# Patient Record
Sex: MALE | Race: WHITE | NOT HISPANIC OR LATINO | URBAN - METROPOLITAN AREA
[De-identification: names, ages, dates, MRNs, and addresses within clinical notes are randomized per-mention and may not be internally consistent; named-entity substitution may affect disease eponyms.]

---

## 2015-07-22 LAB — EJECTION FRACTION: 57

## 2018-06-19 ENCOUNTER — AMBULATORY - HEALTHEAST (OUTPATIENT)
Dept: CARDIOLOGY | Facility: CLINIC | Age: 78
End: 2018-06-19

## 2018-06-25 ENCOUNTER — OFFICE VISIT - HEALTHEAST (OUTPATIENT)
Dept: CARDIOLOGY | Facility: CLINIC | Age: 78
End: 2018-06-25

## 2018-06-25 DIAGNOSIS — I10 ESSENTIAL HYPERTENSION: ICD-10-CM

## 2018-06-25 DIAGNOSIS — E78.00 HYPERCHOLESTEREMIA: ICD-10-CM

## 2018-06-25 DIAGNOSIS — I25.83 CORONARY ATHEROSCLEROSIS DUE TO LIPID RICH PLAQUE: ICD-10-CM

## 2018-06-25 DIAGNOSIS — I62.9 INTRACRANIAL HEMORRHAGE (H): ICD-10-CM

## 2018-06-25 ASSESSMENT — MIFFLIN-ST. JEOR: SCORE: 1659.94

## 2018-07-06 ENCOUNTER — COMMUNICATION - HEALTHEAST (OUTPATIENT)
Dept: CARDIOLOGY | Facility: CLINIC | Age: 78
End: 2018-07-06

## 2018-07-09 ENCOUNTER — HOSPITAL ENCOUNTER (OUTPATIENT)
Dept: NUCLEAR MEDICINE | Facility: HOSPITAL | Age: 78
Discharge: HOME | End: 2018-07-09
Attending: INTERNAL MEDICINE

## 2018-07-09 ENCOUNTER — HOSPITAL ENCOUNTER (OUTPATIENT)
Dept: CARDIOLOGY | Facility: HOSPITAL | Age: 78
Discharge: HOME | End: 2018-07-09
Attending: INTERNAL MEDICINE

## 2018-07-09 DIAGNOSIS — I25.83 CORONARY ATHEROSCLEROSIS DUE TO LIPID RICH PLAQUE: ICD-10-CM

## 2018-07-09 DIAGNOSIS — E78.00 HYPERCHOLESTEREMIA: ICD-10-CM

## 2018-07-09 LAB
CV STRESS CURRENT BP HE: NORMAL
CV STRESS CURRENT HR HE: 68
CV STRESS CURRENT HR HE: 72
CV STRESS CURRENT HR HE: 73
CV STRESS CURRENT HR HE: 78
CV STRESS CURRENT HR HE: 79
CV STRESS CURRENT HR HE: 82
CV STRESS CURRENT HR HE: 85
CV STRESS CURRENT HR HE: 87
CV STRESS CURRENT HR HE: 87
CV STRESS CURRENT HR HE: 88
CV STRESS DEVIATION TIME HE: NORMAL
CV STRESS ECHO PERCENT HR HE: NORMAL
CV STRESS EXERCISE STAGE HE: NORMAL
CV STRESS FINAL RESTING BP HE: NORMAL
CV STRESS FINAL RESTING HR HE: 79
CV STRESS MAX HR HE: 87
CV STRESS MAX TREADMILL GRADE HE: 0
CV STRESS MAX TREADMILL SPEED HE: 0
CV STRESS PEAK DIA BP HE: NORMAL
CV STRESS PEAK SYS BP HE: NORMAL
CV STRESS PHASE HE: NORMAL
CV STRESS PROTOCOL HE: NORMAL
CV STRESS RESTING PT POSITION HE: NORMAL
CV STRESS ST DEVIATION AMOUNT HE: NORMAL
CV STRESS ST DEVIATION ELEVATION HE: NORMAL
CV STRESS ST EVELATION AMOUNT HE: NORMAL
CV STRESS TEST TYPE HE: NORMAL
CV STRESS TOTAL STAGE TIME MIN 1 HE: NORMAL
NUC STRESS EJECTION FRACTION: 43 %
STRESS ECHO BASELINE BP: NORMAL
STRESS ECHO BASELINE HR: 68
STRESS ECHO CALCULATED PERCENT HR: 62 %
STRESS ECHO LAST STRESS BP: NORMAL
STRESS ECHO LAST STRESS HR: 88

## 2018-07-12 ENCOUNTER — AMBULATORY - HEALTHEAST (OUTPATIENT)
Dept: CARDIOLOGY | Facility: CLINIC | Age: 78
End: 2018-07-12

## 2018-07-12 DIAGNOSIS — R94.39 ABNORMAL STRESS TEST: ICD-10-CM

## 2018-07-13 ENCOUNTER — COMMUNICATION - HEALTHEAST (OUTPATIENT)
Dept: CARDIOLOGY | Facility: CLINIC | Age: 78
End: 2018-07-13

## 2018-07-17 ENCOUNTER — SURGERY - HEALTHEAST (OUTPATIENT)
Dept: CARDIOLOGY | Facility: CLINIC | Age: 78
End: 2018-07-17

## 2018-07-23 ENCOUNTER — SURGERY - HEALTHEAST (OUTPATIENT)
Dept: CARDIOLOGY | Facility: CLINIC | Age: 78
End: 2018-07-23

## 2018-07-23 ASSESSMENT — MIFFLIN-ST. JEOR: SCORE: 1697.36

## 2018-07-26 ENCOUNTER — OFFICE VISIT - HEALTHEAST (OUTPATIENT)
Dept: CARDIOLOGY | Facility: CLINIC | Age: 78
End: 2018-07-26

## 2018-07-26 DIAGNOSIS — I25.10 CAD (CORONARY ARTERY DISEASE): ICD-10-CM

## 2018-07-26 ASSESSMENT — MIFFLIN-ST. JEOR: SCORE: 1697.36

## 2018-07-27 ENCOUNTER — TRANSFERRED RECORDS (OUTPATIENT)
Dept: HEALTH INFORMATION MANAGEMENT | Facility: CLINIC | Age: 78
End: 2018-07-27

## 2018-07-28 ENCOUNTER — TRANSFERRED RECORDS (OUTPATIENT)
Dept: HEALTH INFORMATION MANAGEMENT | Facility: CLINIC | Age: 78
End: 2018-07-28

## 2018-07-30 ENCOUNTER — SURGERY - HEALTHEAST (OUTPATIENT)
Dept: CARDIOLOGY | Facility: CLINIC | Age: 78
End: 2018-07-30

## 2018-07-30 DIAGNOSIS — Z01.818 PRE-OP EVALUATION: ICD-10-CM

## 2018-08-01 ENCOUNTER — HOSPITAL ENCOUNTER (OUTPATIENT)
Dept: SURGERY | Facility: CLINIC | Age: 78
Discharge: HOME | End: 2018-08-01

## 2018-08-01 DIAGNOSIS — I25.10 CAD (CORONARY ARTERY DISEASE): ICD-10-CM

## 2018-08-07 ENCOUNTER — HOSPITAL ENCOUNTER (OUTPATIENT)
Dept: SURGERY | Facility: CLINIC | Age: 78
Discharge: HOME | End: 2018-08-07
Attending: THORACIC SURGERY (CARDIOTHORACIC VASCULAR SURGERY)

## 2018-08-07 ENCOUNTER — HOSPITAL ENCOUNTER (OUTPATIENT)
Dept: CT IMAGING | Facility: CLINIC | Age: 78
Discharge: HOME | End: 2018-08-07
Attending: THORACIC SURGERY (CARDIOTHORACIC VASCULAR SURGERY)

## 2018-08-07 ENCOUNTER — HOSPITAL ENCOUNTER (OUTPATIENT)
Dept: CARDIOLOGY | Facility: CLINIC | Age: 78
Discharge: HOME | End: 2018-08-07
Attending: THORACIC SURGERY (CARDIOTHORACIC VASCULAR SURGERY)

## 2018-08-07 ENCOUNTER — HOSPITAL ENCOUNTER (OUTPATIENT)
Dept: ULTRASOUND IMAGING | Facility: CLINIC | Age: 78
Discharge: HOME | End: 2018-08-07
Attending: THORACIC SURGERY (CARDIOTHORACIC VASCULAR SURGERY)

## 2018-08-07 ENCOUNTER — ANESTHESIA - HEALTHEAST (OUTPATIENT)
Dept: SURGERY | Facility: CLINIC | Age: 78
End: 2018-08-07

## 2018-08-07 DIAGNOSIS — I25.10 CORONARY ATHEROSCLEROSIS: ICD-10-CM

## 2018-08-07 DIAGNOSIS — Z01.818 PRE-OP EVALUATION: ICD-10-CM

## 2018-08-07 DIAGNOSIS — I25.10 CAD (CORONARY ARTERY DISEASE): ICD-10-CM

## 2018-08-07 LAB
ABO/RH(D): NORMAL
ALBUMIN UR-MCNC: NEGATIVE MG/DL
ANION GAP SERPL CALCULATED.3IONS-SCNC: 8 MMOL/L (ref 5–18)
ANTIBODY SCREEN: NEGATIVE
AORTIC ROOT: 4.1 CM
AORTIC VALVE MEAN VELOCITY: 113 CM/S
APPEARANCE UR: CLEAR
ATRIAL RATE - MUSE: 64 BPM
AV DIMENSIONLESS INDEX VTI: 0.5
AV MEAN GRADIENT: 6 MMHG
AV PEAK GRADIENT: 10 MMHG
AV VALVE AREA: 1.8 CM2
AV VELOCITY RATIO: 0.5
BILIRUB UR QL STRIP: NEGATIVE
BSA FOR ECHO PROCEDURE: 2.21 M2
BUN SERPL-MCNC: 10 MG/DL (ref 8–28)
CALCIUM SERPL-MCNC: 9.2 MG/DL (ref 8.5–10.5)
CHLORIDE BLD-SCNC: 107 MMOL/L (ref 98–107)
CO2 SERPL-SCNC: 23 MMOL/L (ref 22–31)
COLOR UR AUTO: YELLOW
CREAT SERPL-MCNC: 0.82 MG/DL (ref 0.7–1.3)
CV BLOOD PRESSURE: NORMAL MMHG
CV ECHO HEIGHT: 71 IN
CV ECHO WEIGHT: 215 LBS
DIASTOLIC BLOOD PRESSURE - MUSE: NORMAL MMHG
DOP CALC AO PEAK VEL: 158 CM/S
DOP CALC AO VTI: 31.3 CM
DOP CALC LVOT AREA: 3.46 CM2
DOP CALC LVOT DIAMETER: 2.1 CM
DOP CALC LVOT PEAK VEL: 83.1 CM/S
DOP CALC LVOT STROKE VOLUME: 55.7 CM3
DOP CALCLVOT PEAK VEL VTI: 16.1 CM
EJECTION FRACTION: 65 % (ref 55–75)
ERYTHROCYTE [DISTWIDTH] IN BLOOD BY AUTOMATED COUNT: 14.3 % (ref 11–14.5)
FRACTIONAL SHORTENING: 25.6 % (ref 28–44)
GFR SERPL CREATININE-BSD FRML MDRD: >60 ML/MIN/1.73M2
GLUCOSE BLD-MCNC: 142 MG/DL (ref 70–125)
GLUCOSE UR STRIP-MCNC: NEGATIVE MG/DL
HBA1C MFR BLD: 7 % (ref 4.2–6.1)
HCT VFR BLD AUTO: 40.8 % (ref 40–54)
HGB BLD-MCNC: 14 G/DL (ref 14–18)
HGB UR QL STRIP: NEGATIVE
INR PPP: 1.06 (ref 0.9–1.1)
INTERPRETATION ECG - MUSE: NORMAL
INTERVENTRICULAR SEPTUM IN END DIASTOLE: 1.2 CM (ref 0.6–1)
IVS/PW RATIO: 1.1
KETONES UR STRIP-MCNC: NEGATIVE MG/DL
LA AREA 1: 18.4 CM2
LA AREA 2: 18.4 CM2
LEFT ATRIUM LENGTH: 5.21 CM
LEFT ATRIUM SIZE: 3.8 CM
LEFT ATRIUM TO AORTIC ROOT RATIO: 0.93 NO UNITS
LEFT ATRIUM VOLUME INDEX: 25 ML/M2
LEFT ATRIUM VOLUME: 55.2 ML
LEFT VENTRICLE CARDIAC INDEX: 2.2 L/MIN/M2
LEFT VENTRICLE CARDIAC OUTPUT: 4.8 L/MIN
LEFT VENTRICLE DIASTOLIC VOLUME INDEX: 37.6 CM3/M2 (ref 34–74)
LEFT VENTRICLE DIASTOLIC VOLUME: 83 CM3 (ref 62–150)
LEFT VENTRICLE HEART RATE: 86 BPM
LEFT VENTRICLE MASS INDEX: 67.7 G/M2
LEFT VENTRICLE SYSTOLIC VOLUME INDEX: 13.1 CM3/M2 (ref 11–31)
LEFT VENTRICLE SYSTOLIC VOLUME: 29 CM3 (ref 21–61)
LEFT VENTRICULAR INTERNAL DIMENSION IN DIASTOLE: 3.9 CM (ref 4.2–5.8)
LEFT VENTRICULAR INTERNAL DIMENSION IN SYSTOLE: 2.9 CM (ref 2.5–4)
LEFT VENTRICULAR MASS: 149.5 G
LEFT VENTRICULAR OUTFLOW TRACT MEAN GRADIENT: 1 MMHG
LEFT VENTRICULAR OUTFLOW TRACT MEAN VELOCITY: 54 CM/S
LEFT VENTRICULAR OUTFLOW TRACT PEAK GRADIENT: 3 MMHG
LEFT VENTRICULAR POSTERIOR WALL IN END DIASTOLE: 1.1 CM (ref 0.6–1)
LEUKOCYTE ESTERASE UR QL STRIP: NEGATIVE
LV STROKE VOLUME INDEX: 25.2 ML/M2
MAGNESIUM SERPL-MCNC: 2.1 MG/DL (ref 1.8–2.6)
MCH RBC QN AUTO: 30.1 PG (ref 27–34)
MCHC RBC AUTO-ENTMCNC: 34.3 G/DL (ref 32–36)
MCV RBC AUTO: 88 FL (ref 80–100)
MITRAL VALVE E/A RATIO: 0.5
MV AVERAGE E/E' RATIO: 6.2 CM/S
MV DECELERATION TIME: 388 MS
MV E'TISSUE VEL-LAT: 10.8 CM/S
MV E'TISSUE VEL-MED: 7.12 CM/S
MV LATERAL E/E' RATIO: 5.1
MV MEDIAL E/E' RATIO: 7.8
MV PEAK A VELOCITY: 113 CM/S
MV PEAK E VELOCITY: 55.3 CM/S
NITRATE UR QL: NEGATIVE
NUC REST DIASTOLIC VOLUME INDEX: 3440 LBS
NUC REST SYSTOLIC VOLUME INDEX: 71 IN
P AXIS - MUSE: 39 DEGREES
PH UR STRIP: 5 [PH] (ref 4.5–8)
PLATELET # BLD AUTO: 230 THOU/UL (ref 140–440)
PMV BLD AUTO: 9.3 FL (ref 8.5–12.5)
POTASSIUM BLD-SCNC: 4.3 MMOL/L (ref 3.5–5)
PR INTERVAL - MUSE: 182 MS
PREALB SERPL-MCNC: 20.4 MG/DL (ref 19–38)
QRS DURATION - MUSE: 100 MS
QT - MUSE: 420 MS
QTC - MUSE: 433 MS
R AXIS - MUSE: 7 DEGREES
RBC # BLD AUTO: 4.65 MILL/UL (ref 4.4–6.2)
SODIUM SERPL-SCNC: 138 MMOL/L (ref 136–145)
SP GR UR STRIP: 1.01 (ref 1–1.03)
SYSTOLIC BLOOD PRESSURE - MUSE: NORMAL MMHG
T AXIS - MUSE: 12 DEGREES
TRICUSPID VALVE ANULAR PLANE SYSTOLIC EXCURSION: 2.3 CM
UROBILINOGEN UR STRIP-ACNC: NORMAL
VENTRICULAR RATE- MUSE: 64 BPM
WBC: 13.2 THOU/UL (ref 4–11)

## 2018-08-07 ASSESSMENT — MIFFLIN-ST. JEOR
SCORE: 1670.6
SCORE: 1697.36

## 2018-08-08 ENCOUNTER — SURGERY - HEALTHEAST (OUTPATIENT)
Dept: SURGERY | Facility: CLINIC | Age: 78
End: 2018-08-08

## 2018-08-08 ENCOUNTER — HOSPITAL ENCOUNTER (INPATIENT)
Facility: CLINIC | Age: 78
LOS: 2 days | DRG: 003 | End: 2018-08-10
Attending: INTERNAL MEDICINE | Admitting: THORACIC SURGERY (CARDIOTHORACIC VASCULAR SURGERY)
Payer: MEDICARE

## 2018-08-08 ENCOUNTER — APPOINTMENT (OUTPATIENT)
Dept: GENERAL RADIOLOGY | Facility: CLINIC | Age: 78
DRG: 003 | End: 2018-08-08
Attending: INTERNAL MEDICINE
Payer: MEDICARE

## 2018-08-08 ENCOUNTER — SURGERY - HEALTHEAST (OUTPATIENT)
Dept: CARDIOLOGY | Facility: CLINIC | Age: 78
End: 2018-08-08

## 2018-08-08 PROBLEM — R57.0 CARDIOGENIC SHOCK (H): Status: ACTIVE | Noted: 2018-08-08

## 2018-08-08 LAB
ALBUMIN SERPL-MCNC: 2.7 G/DL (ref 3.4–5)
ALP SERPL-CCNC: 52 U/L (ref 40–150)
ALT SERPL W P-5'-P-CCNC: 19 U/L (ref 0–70)
ANION GAP SERPL CALCULATED.3IONS-SCNC: 17 MMOL/L (ref 3–14)
AST SERPL W P-5'-P-CCNC: 34 U/L (ref 0–45)
BACTERIA SPEC CULT: NORMAL
BASE DEFICIT BLDA-SCNC: 11.1 MMOL/L
BASE DEFICIT BLDA-SCNC: 3 MMOL/L
BASE DEFICIT BLDA-SCNC: 8.2 MMOL/L
BASOPHILS # BLD AUTO: 0 10E9/L (ref 0–0.2)
BASOPHILS NFR BLD AUTO: 0.2 %
BILIRUB SERPL-MCNC: 1 MG/DL (ref 0.2–1.3)
BLD PROD TYP BPU: NORMAL
BLD UNIT ID BPU: 0
BLOOD PRODUCT CODE: NORMAL
BPU ID: NORMAL
BUN SERPL-MCNC: 12 MG/DL (ref 7–30)
CA-I BLD-MCNC: 4.3 MG/DL (ref 4.4–5.2)
CA-I BLD-MCNC: 4.6 MG/DL (ref 4.4–5.2)
CA-I BLD-MCNC: 5.8 MG/DL (ref 4.4–5.2)
CALCIUM SERPL-MCNC: 7.8 MG/DL (ref 8.5–10.1)
CHLORIDE SERPL-SCNC: 114 MMOL/L (ref 94–109)
CO2 SERPL-SCNC: 17 MMOL/L (ref 20–32)
CREAT SERPL-MCNC: 0.8 MG/DL (ref 0.66–1.25)
CRP SERPL-MCNC: 5.3 MG/L (ref 0–8)
D DIMER PPP FEU-MCNC: 1.7 UG/ML FEU (ref 0–0.5)
DIFFERENTIAL METHOD BLD: ABNORMAL
EOSINOPHIL # BLD AUTO: 0 10E9/L (ref 0–0.7)
EOSINOPHIL NFR BLD AUTO: 0.1 %
ERYTHROCYTE [DISTWIDTH] IN BLOOD BY AUTOMATED COUNT: 14.9 % (ref 10–15)
ERYTHROCYTE [SEDIMENTATION RATE] IN BLOOD BY WESTERGREN METHOD: 12 MM/H (ref 0–20)
GFR SERPL CREATININE-BSD FRML MDRD: >90 ML/MIN/1.7M2
GLUCOSE SERPL-MCNC: 460 MG/DL (ref 70–99)
HBA1C MFR BLD: 6.2 % (ref 0–5.6)
HCO3 BLD-SCNC: 16 MMOL/L (ref 21–28)
HCO3 BLD-SCNC: 17 MMOL/L (ref 21–28)
HCO3 BLD-SCNC: 21 MMOL/L (ref 21–28)
HCT VFR BLD AUTO: 25.8 % (ref 40–53)
HGB BLD-MCNC: 8.6 G/DL (ref 13.3–17.7)
IMM GRANULOCYTES # BLD: 0.1 10E9/L (ref 0–0.4)
IMM GRANULOCYTES NFR BLD: 0.7 %
INR PPP: 2.09 (ref 0.86–1.14)
LACTATE BLD-SCNC: 12.1 MMOL/L (ref 0.7–2)
LACTATE BLD-SCNC: 12.2 MMOL/L (ref 0.7–2)
LACTATE BLD-SCNC: 14.6 MMOL/L (ref 0.7–2)
LACTATE BLD-SCNC: 15 MMOL/L (ref 0.7–2)
LDH SERPL L TO P-CCNC: 181 U/L (ref 85–227)
LYMPHOCYTES # BLD AUTO: 1.6 10E9/L (ref 0.8–5.3)
LYMPHOCYTES NFR BLD AUTO: 11.9 %
MAGNESIUM SERPL-MCNC: 2.2 MG/DL (ref 1.6–2.3)
MCH RBC QN AUTO: 29.5 PG (ref 26.5–33)
MCHC RBC AUTO-ENTMCNC: 33.3 G/DL (ref 31.5–36.5)
MCV RBC AUTO: 88 FL (ref 78–100)
MONOCYTES # BLD AUTO: 0.8 10E9/L (ref 0–1.3)
MONOCYTES NFR BLD AUTO: 6.2 %
NEUTROPHILS # BLD AUTO: 10.7 10E9/L (ref 1.6–8.3)
NEUTROPHILS NFR BLD AUTO: 80.9 %
NRBC # BLD AUTO: 0 10*3/UL
NRBC BLD AUTO-RTO: 0 /100
NUM BPU REQUESTED: 2
O2/TOTAL GAS SETTING VFR VENT: 100 %
O2/TOTAL GAS SETTING VFR VENT: 100 %
O2/TOTAL GAS SETTING VFR VENT: ABNORMAL %
OXYHGB MFR BLD: 97 % (ref 92–100)
PCO2 BLD: 31 MM HG (ref 35–45)
PCO2 BLD: 31 MM HG (ref 35–45)
PCO2 BLD: 42 MM HG (ref 35–45)
PH BLD: 7.2 PH (ref 7.35–7.45)
PH BLD: 7.34 PH (ref 7.35–7.45)
PH BLD: 7.44 PH (ref 7.35–7.45)
PHOSPHATE SERPL-MCNC: 2.6 MG/DL (ref 2.5–4.5)
PLATELET # BLD AUTO: 113 10E9/L (ref 150–450)
PO2 BLD: 155 MM HG (ref 80–105)
PO2 BLD: 454 MM HG (ref 80–105)
PO2 BLD: 457 MM HG (ref 80–105)
POTASSIUM BLD-SCNC: 2.6 MMOL/L (ref 3.4–5.3)
POTASSIUM SERPL-SCNC: 3 MMOL/L (ref 3.4–5.3)
PROT SERPL-MCNC: 3.8 G/DL (ref 6.8–8.8)
RBC # BLD AUTO: 2.92 10E12/L (ref 4.4–5.9)
SODIUM SERPL-SCNC: 148 MMOL/L (ref 133–144)
TRANSFUSION STATUS PATIENT QL: NORMAL
TROPONIN I SERPL-MCNC: 11.7 UG/L (ref 0–0.04)
WBC # BLD AUTO: 13.2 10E9/L (ref 4–11)

## 2018-08-08 PROCEDURE — 27211333 ZZ H CANNULA, VENOUS CENTRAL

## 2018-08-08 PROCEDURE — 25000128 H RX IP 250 OP 636: Performed by: THORACIC SURGERY (CARDIOTHORACIC VASCULAR SURGERY)

## 2018-08-08 PROCEDURE — 82803 BLOOD GASES ANY COMBINATION: CPT | Performed by: THORACIC SURGERY (CARDIOTHORACIC VASCULAR SURGERY)

## 2018-08-08 PROCEDURE — 86900 BLOOD TYPING SEROLOGIC ABO: CPT | Performed by: THORACIC SURGERY (CARDIOTHORACIC VASCULAR SURGERY)

## 2018-08-08 PROCEDURE — 40000556 ZZH STATISTIC PERIPHERAL IV START W US GUIDANCE

## 2018-08-08 PROCEDURE — 86901 BLOOD TYPING SEROLOGIC RH(D): CPT | Performed by: THORACIC SURGERY (CARDIOTHORACIC VASCULAR SURGERY)

## 2018-08-08 PROCEDURE — 25000128 H RX IP 250 OP 636: Performed by: STUDENT IN AN ORGANIZED HEALTH CARE EDUCATION/TRAINING PROGRAM

## 2018-08-08 PROCEDURE — 85025 COMPLETE CBC W/AUTO DIFF WBC: CPT | Performed by: STUDENT IN AN ORGANIZED HEALTH CARE EDUCATION/TRAINING PROGRAM

## 2018-08-08 PROCEDURE — 5A1945Z RESPIRATORY VENTILATION, 24-96 CONSECUTIVE HOURS: ICD-10-PCS | Performed by: THORACIC SURGERY (CARDIOTHORACIC VASCULAR SURGERY)

## 2018-08-08 PROCEDURE — 25000128 H RX IP 250 OP 636: Performed by: INTERNAL MEDICINE

## 2018-08-08 PROCEDURE — 83605 ASSAY OF LACTIC ACID: CPT | Performed by: THORACIC SURGERY (CARDIOTHORACIC VASCULAR SURGERY)

## 2018-08-08 PROCEDURE — 80307 DRUG TEST PRSMV CHEM ANLYZR: CPT | Performed by: STUDENT IN AN ORGANIZED HEALTH CARE EDUCATION/TRAINING PROGRAM

## 2018-08-08 PROCEDURE — 33947 ECMO/ECLS INITIATION ARTERY: CPT

## 2018-08-08 PROCEDURE — 84100 ASSAY OF PHOSPHORUS: CPT | Performed by: STUDENT IN AN ORGANIZED HEALTH CARE EDUCATION/TRAINING PROGRAM

## 2018-08-08 PROCEDURE — 27211327 ZZ H KIT ECMO CIRCUIT ONLY, ADULT

## 2018-08-08 PROCEDURE — 86850 RBC ANTIBODY SCREEN: CPT | Performed by: THORACIC SURGERY (CARDIOTHORACIC VASCULAR SURGERY)

## 2018-08-08 PROCEDURE — 83051 HEMOGLOBIN PLASMA: CPT | Performed by: STUDENT IN AN ORGANIZED HEALTH CARE EDUCATION/TRAINING PROGRAM

## 2018-08-08 PROCEDURE — 83605 ASSAY OF LACTIC ACID: CPT | Performed by: STUDENT IN AN ORGANIZED HEALTH CARE EDUCATION/TRAINING PROGRAM

## 2018-08-08 PROCEDURE — 82330 ASSAY OF CALCIUM: CPT | Performed by: THORACIC SURGERY (CARDIOTHORACIC VASCULAR SURGERY)

## 2018-08-08 PROCEDURE — 27210545 ZZH PUMP CENTRIMAG ADULT

## 2018-08-08 PROCEDURE — 82533 TOTAL CORTISOL: CPT | Performed by: STUDENT IN AN ORGANIZED HEALTH CARE EDUCATION/TRAINING PROGRAM

## 2018-08-08 PROCEDURE — 84132 ASSAY OF SERUM POTASSIUM: CPT | Performed by: THORACIC SURGERY (CARDIOTHORACIC VASCULAR SURGERY)

## 2018-08-08 PROCEDURE — 86850 RBC ANTIBODY SCREEN: CPT | Performed by: STUDENT IN AN ORGANIZED HEALTH CARE EDUCATION/TRAINING PROGRAM

## 2018-08-08 PROCEDURE — 27211336 ZZ H CANNULA, ARTERIAL CENTRAL

## 2018-08-08 PROCEDURE — P9037 PLATE PHERES LEUKOREDU IRRAD: HCPCS | Performed by: STUDENT IN AN ORGANIZED HEALTH CARE EDUCATION/TRAINING PROGRAM

## 2018-08-08 PROCEDURE — 85652 RBC SED RATE AUTOMATED: CPT | Performed by: STUDENT IN AN ORGANIZED HEALTH CARE EDUCATION/TRAINING PROGRAM

## 2018-08-08 PROCEDURE — 40000048 ZZH STATISTIC DAILY SWAN MONITORING

## 2018-08-08 PROCEDURE — 40000986 XR CHEST PORT 1 VW

## 2018-08-08 PROCEDURE — 82330 ASSAY OF CALCIUM: CPT | Performed by: STUDENT IN AN ORGANIZED HEALTH CARE EDUCATION/TRAINING PROGRAM

## 2018-08-08 PROCEDURE — 40000014 ZZH STATISTIC ARTERIAL MONITORING DAILY

## 2018-08-08 PROCEDURE — 80053 COMPREHEN METABOLIC PANEL: CPT | Performed by: STUDENT IN AN ORGANIZED HEALTH CARE EDUCATION/TRAINING PROGRAM

## 2018-08-08 PROCEDURE — 40000196 ZZH STATISTIC RAPCV CVP MONITORING

## 2018-08-08 PROCEDURE — 27211329 ZZ H DEVICE OXYGENATOR QUADROX ECMO, ADULT

## 2018-08-08 PROCEDURE — 80347 BENZODIAZEPINES 13 OR MORE: CPT | Performed by: STUDENT IN AN ORGANIZED HEALTH CARE EDUCATION/TRAINING PROGRAM

## 2018-08-08 PROCEDURE — 20000004 ZZH R&B ICU UMMC

## 2018-08-08 PROCEDURE — 85379 FIBRIN DEGRADATION QUANT: CPT | Performed by: STUDENT IN AN ORGANIZED HEALTH CARE EDUCATION/TRAINING PROGRAM

## 2018-08-08 PROCEDURE — 86140 C-REACTIVE PROTEIN: CPT | Performed by: STUDENT IN AN ORGANIZED HEALTH CARE EDUCATION/TRAINING PROGRAM

## 2018-08-08 PROCEDURE — 25000125 ZZHC RX 250

## 2018-08-08 PROCEDURE — 25000125 ZZHC RX 250: Performed by: THORACIC SURGERY (CARDIOTHORACIC VASCULAR SURGERY)

## 2018-08-08 PROCEDURE — 86901 BLOOD TYPING SEROLOGIC RH(D): CPT | Performed by: STUDENT IN AN ORGANIZED HEALTH CARE EDUCATION/TRAINING PROGRAM

## 2018-08-08 PROCEDURE — 94002 VENT MGMT INPAT INIT DAY: CPT

## 2018-08-08 PROCEDURE — 40000275 ZZH STATISTIC RCP TIME EA 10 MIN

## 2018-08-08 PROCEDURE — P9059 PLASMA, FRZ BETWEEN 8-24HOUR: HCPCS | Performed by: STUDENT IN AN ORGANIZED HEALTH CARE EDUCATION/TRAINING PROGRAM

## 2018-08-08 PROCEDURE — P9016 RBC LEUKOCYTES REDUCED: HCPCS | Performed by: THORACIC SURGERY (CARDIOTHORACIC VASCULAR SURGERY)

## 2018-08-08 PROCEDURE — 83735 ASSAY OF MAGNESIUM: CPT | Performed by: STUDENT IN AN ORGANIZED HEALTH CARE EDUCATION/TRAINING PROGRAM

## 2018-08-08 PROCEDURE — 3E033XZ INTRODUCTION OF VASOPRESSOR INTO PERIPHERAL VEIN, PERCUTANEOUS APPROACH: ICD-10-PCS | Performed by: THORACIC SURGERY (CARDIOTHORACIC VASCULAR SURGERY)

## 2018-08-08 PROCEDURE — 84484 ASSAY OF TROPONIN QUANT: CPT | Performed by: STUDENT IN AN ORGANIZED HEALTH CARE EDUCATION/TRAINING PROGRAM

## 2018-08-08 PROCEDURE — 83036 HEMOGLOBIN GLYCOSYLATED A1C: CPT | Performed by: THORACIC SURGERY (CARDIOTHORACIC VASCULAR SURGERY)

## 2018-08-08 PROCEDURE — 82805 BLOOD GASES W/O2 SATURATION: CPT | Performed by: STUDENT IN AN ORGANIZED HEALTH CARE EDUCATION/TRAINING PROGRAM

## 2018-08-08 PROCEDURE — 25000128 H RX IP 250 OP 636

## 2018-08-08 PROCEDURE — 5A15223 EXTRACORPOREAL MEMBRANE OXYGENATION, CONTINUOUS: ICD-10-PCS | Performed by: THORACIC SURGERY (CARDIOTHORACIC VASCULAR SURGERY)

## 2018-08-08 PROCEDURE — 86900 BLOOD TYPING SEROLOGIC ABO: CPT | Performed by: STUDENT IN AN ORGANIZED HEALTH CARE EDUCATION/TRAINING PROGRAM

## 2018-08-08 PROCEDURE — 00000146 ZZHCL STATISTIC GLUCOSE BY METER IP

## 2018-08-08 PROCEDURE — 85610 PROTHROMBIN TIME: CPT | Performed by: STUDENT IN AN ORGANIZED HEALTH CARE EDUCATION/TRAINING PROGRAM

## 2018-08-08 PROCEDURE — 83615 LACTATE (LD) (LDH) ENZYME: CPT | Performed by: STUDENT IN AN ORGANIZED HEALTH CARE EDUCATION/TRAINING PROGRAM

## 2018-08-08 PROCEDURE — 86923 COMPATIBILITY TEST ELECTRIC: CPT | Performed by: THORACIC SURGERY (CARDIOTHORACIC VASCULAR SURGERY)

## 2018-08-08 PROCEDURE — 40000344 ZZHCL STATISTIC THAWING COMPONENT: Performed by: STUDENT IN AN ORGANIZED HEALTH CARE EDUCATION/TRAINING PROGRAM

## 2018-08-08 RX ORDER — MIDAZOLAM (PF) 1 MG/ML IN 0.9 % SODIUM CHLORIDE INTRAVENOUS SOLUTION
1-8 CONTINUOUS
Status: DISCONTINUED | OUTPATIENT
Start: 2018-08-08 | End: 2018-08-10 | Stop reason: HOSPADM

## 2018-08-08 RX ORDER — PIPERACILLIN SODIUM, TAZOBACTAM SODIUM 3; .375 G/15ML; G/15ML
3.38 INJECTION, POWDER, LYOPHILIZED, FOR SOLUTION INTRAVENOUS EVERY 6 HOURS
Status: DISCONTINUED | OUTPATIENT
Start: 2018-08-09 | End: 2018-08-10 | Stop reason: HOSPADM

## 2018-08-08 RX ORDER — LIDOCAINE 40 MG/G
CREAM TOPICAL
Status: DISCONTINUED | OUTPATIENT
Start: 2018-08-08 | End: 2018-08-10 | Stop reason: HOSPADM

## 2018-08-08 RX ORDER — MAGNESIUM SULFATE HEPTAHYDRATE 40 MG/ML
2 INJECTION, SOLUTION INTRAVENOUS DAILY PRN
Status: DISCONTINUED | OUTPATIENT
Start: 2018-08-08 | End: 2018-08-09 | Stop reason: DRUGHIGH

## 2018-08-08 RX ORDER — HEPARIN SODIUM (PORCINE) LOCK FLUSH IV SOLN 100 UNIT/ML 100 UNIT/ML
5-10 SOLUTION INTRAVENOUS EVERY 30 MIN PRN
Status: DISCONTINUED | OUTPATIENT
Start: 2018-08-08 | End: 2018-08-08

## 2018-08-08 RX ORDER — NALOXONE HYDROCHLORIDE 0.4 MG/ML
.1-.4 INJECTION, SOLUTION INTRAMUSCULAR; INTRAVENOUS; SUBCUTANEOUS
Status: DISCONTINUED | OUTPATIENT
Start: 2018-08-08 | End: 2018-08-10 | Stop reason: HOSPADM

## 2018-08-08 RX ORDER — FENTANYL CITRATE 50 UG/ML
50 INJECTION, SOLUTION INTRAMUSCULAR; INTRAVENOUS EVERY 30 MIN PRN
Status: DISCONTINUED | OUTPATIENT
Start: 2018-08-08 | End: 2018-08-10 | Stop reason: HOSPADM

## 2018-08-08 RX ORDER — MAGNESIUM SULFATE HEPTAHYDRATE 40 MG/ML
4 INJECTION, SOLUTION INTRAVENOUS EVERY 4 HOURS PRN
Status: DISCONTINUED | OUTPATIENT
Start: 2018-08-08 | End: 2018-08-09 | Stop reason: DRUGHIGH

## 2018-08-08 RX ORDER — POTASSIUM CHLORIDE 29.8 MG/ML
20 INJECTION INTRAVENOUS
Status: COMPLETED | OUTPATIENT
Start: 2018-08-08 | End: 2018-08-09

## 2018-08-08 RX ORDER — MEPERIDINE HYDROCHLORIDE 50 MG/ML
25-50 INJECTION INTRAMUSCULAR; INTRAVENOUS; SUBCUTANEOUS
Status: ACTIVE | OUTPATIENT
Start: 2018-08-08 | End: 2018-08-09

## 2018-08-08 RX ORDER — NICOTINE POLACRILEX 4 MG
15-30 LOZENGE BUCCAL
Status: DISCONTINUED | OUTPATIENT
Start: 2018-08-08 | End: 2018-08-10 | Stop reason: HOSPADM

## 2018-08-08 RX ORDER — NALOXONE HYDROCHLORIDE 0.4 MG/ML
.1-.4 INJECTION, SOLUTION INTRAMUSCULAR; INTRAVENOUS; SUBCUTANEOUS
Status: CANCELLED | OUTPATIENT
Start: 2018-08-08

## 2018-08-08 RX ORDER — LIDOCAINE 40 MG/G
CREAM TOPICAL
Status: DISCONTINUED | OUTPATIENT
Start: 2018-08-08 | End: 2018-08-08

## 2018-08-08 RX ORDER — ACETYLCYSTEINE 200 MG/ML
1 SOLUTION ORAL; RESPIRATORY (INHALATION)
Status: DISCONTINUED | OUTPATIENT
Start: 2018-08-08 | End: 2018-08-09 | Stop reason: CLARIF

## 2018-08-08 RX ORDER — DOPAMINE HYDROCHLORIDE 160 MG/100ML
2-20 INJECTION, SOLUTION INTRAVENOUS CONTINUOUS
Status: DISCONTINUED | OUTPATIENT
Start: 2018-08-08 | End: 2018-08-10 | Stop reason: HOSPADM

## 2018-08-08 RX ORDER — LIDOCAINE HYDROCHLORIDE ANHYDROUS AND DEXTROSE MONOHYDRATE .8; 5 G/100ML; G/100ML
1-4 INJECTION, SOLUTION INTRAVENOUS CONTINUOUS
Status: DISCONTINUED | OUTPATIENT
Start: 2018-08-08 | End: 2018-08-10 | Stop reason: HOSPADM

## 2018-08-08 RX ORDER — HEPARIN SODIUM (PORCINE) LOCK FLUSH IV SOLN 100 UNIT/ML 100 UNIT/ML
5-10 SOLUTION INTRAVENOUS EVERY 30 MIN PRN
Status: DISCONTINUED | OUTPATIENT
Start: 2018-08-08 | End: 2018-08-10 | Stop reason: HOSPADM

## 2018-08-08 RX ORDER — DEXTROSE MONOHYDRATE 25 G/50ML
25-50 INJECTION, SOLUTION INTRAVENOUS
Status: DISCONTINUED | OUTPATIENT
Start: 2018-08-08 | End: 2018-08-10 | Stop reason: HOSPADM

## 2018-08-08 RX ORDER — CALCIUM CHLORIDE 100 MG/ML
INJECTION INTRAVENOUS; INTRAVENTRICULAR
Status: COMPLETED
Start: 2018-08-08 | End: 2018-08-08

## 2018-08-08 RX ORDER — MIDAZOLAM (PF) 1 MG/ML IN 0.9 % SODIUM CHLORIDE INTRAVENOUS SOLUTION
1-8 CONTINUOUS
Status: CANCELLED | OUTPATIENT
Start: 2018-08-08

## 2018-08-08 RX ADMIN — Medication 4 MG/HR: at 23:53

## 2018-08-08 RX ADMIN — SODIUM BICARBONATE 200 MEQ: 84 INJECTION, SOLUTION INTRAVENOUS at 23:52

## 2018-08-08 RX ADMIN — DEXTROSE MONOHYDRATE 2 MCG/KG/MIN: 50 INJECTION, SOLUTION INTRAVENOUS at 23:54

## 2018-08-08 RX ADMIN — VASOPRESSIN 4 UNITS/HR: 20 INJECTION INTRAVENOUS at 23:52

## 2018-08-08 RX ADMIN — FENTANYL CITRATE 75 MCG/HR: 50 INJECTION, SOLUTION INTRAMUSCULAR; INTRAVENOUS at 23:10

## 2018-08-08 RX ADMIN — LIDOCAINE HYDROCHLORIDE 1 MG/MIN: 8 INJECTION, SOLUTION INTRAVENOUS at 23:53

## 2018-08-08 RX ADMIN — CALCIUM CHLORIDE 1 G: 100 INJECTION INTRAVENOUS; INTRAVENTRICULAR at 23:11

## 2018-08-08 RX ADMIN — HUMAN INSULIN 6 UNITS/HR: 100 INJECTION, SOLUTION SUBCUTANEOUS at 23:53

## 2018-08-08 RX ADMIN — NOREPINEPHRINE BITARTRATE 0.3 MCG/KG/MIN: 1 INJECTION INTRAVENOUS at 23:54

## 2018-08-08 RX ADMIN — Medication 200 MEQ: at 23:52

## 2018-08-08 RX ADMIN — EPINEPHRINE 0.3 MCG/KG/MIN: 1 INJECTION PARENTERAL at 23:53

## 2018-08-08 RX ADMIN — CALCIUM CHLORIDE 1 G: 100 INJECTION INTRAVENOUS; INTRAVENTRICULAR at 23:33

## 2018-08-08 ASSESSMENT — MIFFLIN-ST. JEOR
SCORE: 1639.76
SCORE: 1639.76

## 2018-08-09 ENCOUNTER — APPOINTMENT (OUTPATIENT)
Dept: GENERAL RADIOLOGY | Facility: CLINIC | Age: 78
DRG: 003 | End: 2018-08-09
Attending: INTERNAL MEDICINE
Payer: MEDICARE

## 2018-08-09 ENCOUNTER — ALLIED HEALTH/NURSE VISIT (OUTPATIENT)
Dept: NEUROLOGY | Facility: CLINIC | Age: 78
DRG: 003 | End: 2018-08-09
Attending: PSYCHIATRY & NEUROLOGY
Payer: MEDICARE

## 2018-08-09 ENCOUNTER — APPOINTMENT (OUTPATIENT)
Dept: ULTRASOUND IMAGING | Facility: CLINIC | Age: 78
DRG: 003 | End: 2018-08-09
Attending: INTERNAL MEDICINE
Payer: MEDICARE

## 2018-08-09 DIAGNOSIS — R57.0 CARDIOGENIC SHOCK (H): Primary | ICD-10-CM

## 2018-08-09 LAB
ABO + RH BLD: NORMAL
ABO + RH BLD: NORMAL
ALBUMIN SERPL-MCNC: 2.1 G/DL (ref 3.4–5)
ALBUMIN SERPL-MCNC: 2.6 G/DL (ref 3.4–5)
ALBUMIN SERPL-MCNC: 2.7 G/DL (ref 3.4–5)
ALBUMIN SERPL-MCNC: 2.7 G/DL (ref 3.4–5)
ALBUMIN SERPL-MCNC: 2.9 G/DL (ref 3.4–5)
ALBUMIN UR-MCNC: NEGATIVE MG/DL
ALP SERPL-CCNC: 27 U/L (ref 40–150)
ALP SERPL-CCNC: 29 U/L (ref 40–150)
ALP SERPL-CCNC: 34 U/L (ref 40–150)
ALT SERPL W P-5'-P-CCNC: 109 U/L (ref 0–70)
ALT SERPL W P-5'-P-CCNC: 14 U/L (ref 0–70)
ALT SERPL W P-5'-P-CCNC: 16 U/L (ref 0–70)
ALT SERPL W P-5'-P-CCNC: 17 U/L (ref 0–70)
ALT SERPL W P-5'-P-CCNC: 18 U/L (ref 0–70)
AMPHETAMINES UR QL SCN: NEGATIVE
ANION GAP SERPL CALCULATED.3IONS-SCNC: 19 MMOL/L (ref 3–14)
ANION GAP SERPL CALCULATED.3IONS-SCNC: 7 MMOL/L (ref 3–14)
ANION GAP SERPL CALCULATED.3IONS-SCNC: 7 MMOL/L (ref 3–14)
ANION GAP SERPL CALCULATED.3IONS-SCNC: 8 MMOL/L (ref 3–14)
ANION GAP SERPL CALCULATED.3IONS-SCNC: 9 MMOL/L (ref 3–14)
ANION GAP SERPL CALCULATED.3IONS-SCNC: 9 MMOL/L (ref 3–14)
APPEARANCE UR: CLEAR
APTT PPP: 101 SEC (ref 22–37)
APTT PPP: 47 SEC (ref 22–37)
APTT PPP: 54 SEC (ref 22–37)
APTT PPP: 54 SEC (ref 22–37)
APTT PPP: 88 SEC (ref 22–37)
AST SERPL W P-5'-P-CCNC: 118 U/L (ref 0–45)
AST SERPL W P-5'-P-CCNC: 21 U/L (ref 0–45)
AST SERPL W P-5'-P-CCNC: 25 U/L (ref 0–45)
AST SERPL W P-5'-P-CCNC: 29 U/L (ref 0–45)
AST SERPL W P-5'-P-CCNC: 38 U/L (ref 0–45)
AT III ACT/NOR PPP CHRO: 34 % (ref 85–135)
BACTERIA SPEC CULT: NORMAL
BACTERIA SPEC CULT: NORMAL
BARBITURATES UR QL: NEGATIVE
BASE DEFICIT BLDA-SCNC: 0.6 MMOL/L
BASE DEFICIT BLDA-SCNC: 1.4 MMOL/L
BASE DEFICIT BLDA-SCNC: 2.2 MMOL/L
BASE DEFICIT BLDA-SCNC: 2.6 MMOL/L
BASE DEFICIT BLDA-SCNC: 3.5 MMOL/L
BASE DEFICIT BLDA-SCNC: NORMAL MMOL/L
BASE DEFICIT BLDV-SCNC: 0.5 MMOL/L
BASE DEFICIT BLDV-SCNC: NORMAL MMOL/L
BASE EXCESS BLDA CALC-SCNC: 1.1 MMOL/L
BASE EXCESS BLDA CALC-SCNC: 1.2 MMOL/L
BASE EXCESS BLDA CALC-SCNC: 1.5 MMOL/L
BASE EXCESS BLDA CALC-SCNC: 2.1 MMOL/L
BASE EXCESS BLDA CALC-SCNC: 3.2 MMOL/L
BASE EXCESS BLDA CALC-SCNC: 5.4 MMOL/L
BASE EXCESS BLDA CALC-SCNC: 6.1 MMOL/L
BASE EXCESS BLDA CALC-SCNC: 6.3 MMOL/L
BASE EXCESS BLDA CALC-SCNC: 6.3 MMOL/L
BASE EXCESS BLDA CALC-SCNC: 6.9 MMOL/L
BASE EXCESS BLDA CALC-SCNC: 7.2 MMOL/L
BASE EXCESS BLDA CALC-SCNC: 7.2 MMOL/L
BASE EXCESS BLDA CALC-SCNC: NORMAL MMOL/L
BASE EXCESS BLDV CALC-SCNC: 2.4 MMOL/L
BASE EXCESS BLDV CALC-SCNC: 5.1 MMOL/L
BASE EXCESS BLDV CALC-SCNC: 5.4 MMOL/L
BASE EXCESS BLDV CALC-SCNC: 6.3 MMOL/L
BASE EXCESS BLDV CALC-SCNC: NORMAL MMOL/L
BENZODIAZ UR QL: POSITIVE
BILIRUB SERPL-MCNC: 1.5 MG/DL (ref 0.2–1.3)
BILIRUB SERPL-MCNC: 1.8 MG/DL (ref 0.2–1.3)
BILIRUB SERPL-MCNC: 1.8 MG/DL (ref 0.2–1.3)
BILIRUB SERPL-MCNC: 2.1 MG/DL (ref 0.2–1.3)
BILIRUB SERPL-MCNC: 2.2 MG/DL (ref 0.2–1.3)
BILIRUB UR QL STRIP: NEGATIVE
BLD GP AB SCN SERPL QL: NORMAL
BLD PROD TYP BPU: NORMAL
BLD UNIT ID BPU: 0
BLOOD BANK CMNT PATIENT-IMP: NORMAL
BLOOD PRODUCT CODE: NORMAL
BPU ID: NORMAL
BUN SERPL-MCNC: 14 MG/DL (ref 7–30)
BUN SERPL-MCNC: 14 MG/DL (ref 7–30)
BUN SERPL-MCNC: 16 MG/DL (ref 7–30)
BUN SERPL-MCNC: 17 MG/DL (ref 7–30)
BUN SERPL-MCNC: 20 MG/DL (ref 7–30)
BUN SERPL-MCNC: 22 MG/DL (ref 7–30)
CA-I BLD-MCNC: 4.3 MG/DL (ref 4.4–5.2)
CA-I BLD-MCNC: 4.3 MG/DL (ref 4.4–5.2)
CA-I BLD-MCNC: 4.4 MG/DL (ref 4.4–5.2)
CA-I BLD-MCNC: 4.4 MG/DL (ref 4.4–5.2)
CA-I BLD-MCNC: 4.6 MG/DL (ref 4.4–5.2)
CA-I BLD-MCNC: 4.6 MG/DL (ref 4.4–5.2)
CA-I BLD-MCNC: 4.9 MG/DL (ref 4.4–5.2)
CALCIUM SERPL-MCNC: 7.6 MG/DL (ref 8.5–10.1)
CALCIUM SERPL-MCNC: 7.8 MG/DL (ref 8.5–10.1)
CALCIUM SERPL-MCNC: 7.9 MG/DL (ref 8.5–10.1)
CALCIUM SERPL-MCNC: 8 MG/DL (ref 8.5–10.1)
CALCIUM SERPL-MCNC: 8.5 MG/DL (ref 8.5–10.1)
CALCIUM SERPL-MCNC: 8.8 MG/DL (ref 8.5–10.1)
CANNABINOIDS UR QL SCN: NEGATIVE
CHLORIDE SERPL-SCNC: 116 MMOL/L (ref 94–109)
CHLORIDE SERPL-SCNC: 118 MMOL/L (ref 94–109)
CHLORIDE SERPL-SCNC: 119 MMOL/L (ref 94–109)
CHLORIDE SERPL-SCNC: 119 MMOL/L (ref 94–109)
CHLORIDE SERPL-SCNC: 120 MMOL/L (ref 94–109)
CHLORIDE SERPL-SCNC: 120 MMOL/L (ref 94–109)
CO2 SERPL-SCNC: 22 MMOL/L (ref 20–32)
CO2 SERPL-SCNC: 27 MMOL/L (ref 20–32)
CO2 SERPL-SCNC: 27 MMOL/L (ref 20–32)
CO2 SERPL-SCNC: 28 MMOL/L (ref 20–32)
CO2 SERPL-SCNC: 30 MMOL/L (ref 20–32)
CO2 SERPL-SCNC: 31 MMOL/L (ref 20–32)
COCAINE UR QL: NEGATIVE
COLOR UR AUTO: ABNORMAL
CREAT SERPL-MCNC: 0.9 MG/DL (ref 0.66–1.25)
CREAT SERPL-MCNC: 0.92 MG/DL (ref 0.66–1.25)
CREAT SERPL-MCNC: 1.23 MG/DL (ref 0.66–1.25)
CREAT SERPL-MCNC: 1.41 MG/DL (ref 0.66–1.25)
CREAT SERPL-MCNC: 1.77 MG/DL (ref 0.66–1.25)
CREAT SERPL-MCNC: 2.15 MG/DL (ref 0.66–1.25)
CRP SERPL-MCNC: 14 MG/L (ref 0–8)
D DIMER PPP FEU-MCNC: 0.6 UG/ML FEU (ref 0–0.5)
D DIMER PPP FEU-MCNC: 13 UG/ML FEU (ref 0–0.5)
ERYTHROCYTE [DISTWIDTH] IN BLOOD BY AUTOMATED COUNT: 14.8 % (ref 10–15)
ERYTHROCYTE [DISTWIDTH] IN BLOOD BY AUTOMATED COUNT: 14.8 % (ref 10–15)
ERYTHROCYTE [DISTWIDTH] IN BLOOD BY AUTOMATED COUNT: 15 % (ref 10–15)
ERYTHROCYTE [DISTWIDTH] IN BLOOD BY AUTOMATED COUNT: 15.1 % (ref 10–15)
ERYTHROCYTE [DISTWIDTH] IN BLOOD BY AUTOMATED COUNT: 15.2 % (ref 10–15)
ERYTHROCYTE [DISTWIDTH] IN BLOOD BY AUTOMATED COUNT: 15.5 % (ref 10–15)
ERYTHROCYTE [SEDIMENTATION RATE] IN BLOOD BY WESTERGREN METHOD: 63 MM/H (ref 0–20)
ETHANOL UR QL SCN: NEGATIVE
FIBRINOGEN PPP-MCNC: 131 MG/DL (ref 200–420)
FIBRINOGEN PPP-MCNC: 163 MG/DL (ref 200–420)
FIBRINOGEN PPP-MCNC: 165 MG/DL (ref 200–420)
FIBRINOGEN PPP-MCNC: 171 MG/DL (ref 200–420)
FIBRINOGEN PPP-MCNC: 182 MG/DL (ref 200–420)
GFR SERPL CREATININE-BSD FRML MDRD: 30 ML/MIN/1.7M2
GFR SERPL CREATININE-BSD FRML MDRD: 37 ML/MIN/1.7M2
GFR SERPL CREATININE-BSD FRML MDRD: 48 ML/MIN/1.7M2
GFR SERPL CREATININE-BSD FRML MDRD: 57 ML/MIN/1.7M2
GFR SERPL CREATININE-BSD FRML MDRD: 79 ML/MIN/1.7M2
GFR SERPL CREATININE-BSD FRML MDRD: 81 ML/MIN/1.7M2
GLUCOSE BLD-MCNC: 112 MG/DL (ref 70–99)
GLUCOSE BLD-MCNC: 149 MG/DL (ref 70–99)
GLUCOSE BLD-MCNC: 213 MG/DL (ref 70–99)
GLUCOSE BLD-MCNC: 344 MG/DL (ref 70–99)
GLUCOSE BLDC GLUCOMTR-MCNC: 104 MG/DL (ref 70–99)
GLUCOSE BLDC GLUCOMTR-MCNC: 128 MG/DL (ref 70–99)
GLUCOSE BLDC GLUCOMTR-MCNC: 148 MG/DL (ref 70–99)
GLUCOSE BLDC GLUCOMTR-MCNC: 156 MG/DL (ref 70–99)
GLUCOSE BLDC GLUCOMTR-MCNC: 192 MG/DL (ref 70–99)
GLUCOSE BLDC GLUCOMTR-MCNC: 239 MG/DL (ref 70–99)
GLUCOSE BLDC GLUCOMTR-MCNC: 257 MG/DL (ref 70–99)
GLUCOSE BLDC GLUCOMTR-MCNC: 311 MG/DL (ref 70–99)
GLUCOSE BLDC GLUCOMTR-MCNC: 315 MG/DL (ref 70–99)
GLUCOSE BLDC GLUCOMTR-MCNC: 331 MG/DL (ref 70–99)
GLUCOSE BLDC GLUCOMTR-MCNC: 340 MG/DL (ref 70–99)
GLUCOSE BLDC GLUCOMTR-MCNC: 359 MG/DL (ref 70–99)
GLUCOSE BLDC GLUCOMTR-MCNC: 370 MG/DL (ref 70–99)
GLUCOSE BLDC GLUCOMTR-MCNC: 395 MG/DL (ref 70–99)
GLUCOSE BLDC GLUCOMTR-MCNC: 425 MG/DL (ref 70–99)
GLUCOSE BLDC GLUCOMTR-MCNC: 72 MG/DL (ref 70–99)
GLUCOSE BLDC GLUCOMTR-MCNC: 78 MG/DL (ref 70–99)
GLUCOSE BLDC GLUCOMTR-MCNC: 89 MG/DL (ref 70–99)
GLUCOSE BLDC GLUCOMTR-MCNC: 96 MG/DL (ref 70–99)
GLUCOSE SERPL-MCNC: 109 MG/DL (ref 70–99)
GLUCOSE SERPL-MCNC: 141 MG/DL (ref 70–99)
GLUCOSE SERPL-MCNC: 202 MG/DL (ref 70–99)
GLUCOSE SERPL-MCNC: 327 MG/DL (ref 70–99)
GLUCOSE SERPL-MCNC: 341 MG/DL (ref 70–99)
GLUCOSE SERPL-MCNC: 86 MG/DL (ref 70–99)
GLUCOSE UR STRIP-MCNC: >1000 MG/DL
GRAM STN SPEC: NORMAL
GRAM STN SPEC: NORMAL
HCO3 BLD-SCNC: 21 MMOL/L (ref 21–28)
HCO3 BLD-SCNC: 23 MMOL/L (ref 21–28)
HCO3 BLD-SCNC: 26 MMOL/L (ref 21–28)
HCO3 BLD-SCNC: 27 MMOL/L (ref 21–28)
HCO3 BLD-SCNC: 28 MMOL/L (ref 21–28)
HCO3 BLD-SCNC: 30 MMOL/L (ref 21–28)
HCO3 BLD-SCNC: 30 MMOL/L (ref 21–28)
HCO3 BLD-SCNC: 31 MMOL/L (ref 21–28)
HCO3 BLD-SCNC: 32 MMOL/L (ref 21–28)
HCO3 BLD-SCNC: NORMAL MMOL/L (ref 21–28)
HCO3 BLDA-SCNC: 22 MMOL/L (ref 21–28)
HCO3 BLDA-SCNC: 30 MMOL/L (ref 21–28)
HCO3 BLDV-SCNC: 24 MMOL/L (ref 21–28)
HCO3 BLDV-SCNC: 27 MMOL/L (ref 21–28)
HCO3 BLDV-SCNC: 31 MMOL/L (ref 21–28)
HCO3 BLDV-SCNC: 31 MMOL/L (ref 21–28)
HCO3 BLDV-SCNC: 32 MMOL/L (ref 21–28)
HCO3 BLDV-SCNC: NORMAL MMOL/L (ref 21–28)
HCT VFR BLD AUTO: 18.4 % (ref 40–53)
HCT VFR BLD AUTO: 21.3 % (ref 40–53)
HCT VFR BLD AUTO: 22.4 % (ref 40–53)
HCT VFR BLD AUTO: 23.2 % (ref 40–53)
HCT VFR BLD AUTO: 25 % (ref 40–53)
HCT VFR BLD AUTO: 26.3 % (ref 40–53)
HGB BLD-MCNC: 6.4 G/DL (ref 13.3–17.7)
HGB BLD-MCNC: 7.5 G/DL (ref 13.3–17.7)
HGB BLD-MCNC: 7.7 G/DL (ref 13.3–17.7)
HGB BLD-MCNC: 8.1 G/DL (ref 13.3–17.7)
HGB BLD-MCNC: 8.6 G/DL (ref 13.3–17.7)
HGB BLD-MCNC: 9.1 G/DL (ref 13.3–17.7)
HGB FREE PLAS-MCNC: <30 MG/DL
HGB FREE PLAS-MCNC: <30 MG/DL
HGB UR QL STRIP: NEGATIVE
HYALINE CASTS #/AREA URNS LPF: 11 /LPF (ref 0–2)
INR PPP: 1.71 (ref 0.86–1.14)
INR PPP: 1.72 (ref 0.86–1.14)
INR PPP: 1.74 (ref 0.86–1.14)
INR PPP: 1.79 (ref 0.86–1.14)
INR PPP: 2.1 (ref 0.86–1.14)
INR PPP: 2.17 (ref 0.86–1.14)
KETONES UR STRIP-MCNC: 40 MG/DL
LACTATE BLD-SCNC: 10.4 MMOL/L (ref 0.7–2)
LACTATE BLD-SCNC: 16 MMOL/L (ref 0.7–2)
LACTATE BLD-SCNC: 16 MMOL/L (ref 0.7–2)
LACTATE BLD-SCNC: 4 MMOL/L (ref 0.7–2)
LACTATE BLD-SCNC: 4.2 MMOL/L (ref 0.7–2)
LACTATE BLD-SCNC: 4.8 MMOL/L (ref 0.7–2)
LACTATE BLD-SCNC: 5 MMOL/L (ref 0.7–2)
LACTATE BLD-SCNC: 5.5 MMOL/L (ref 0.7–2)
LACTATE BLD-SCNC: 6.7 MMOL/L (ref 0.7–2)
LACTATE BLD-SCNC: 8 MMOL/L (ref 0.7–2)
LACTATE BLD-SCNC: 8.1 MMOL/L (ref 0.7–2)
LDH SERPL L TO P-CCNC: 175 U/L (ref 85–227)
LEUKOCYTE ESTERASE UR QL STRIP: NEGATIVE
LMWH PPP CHRO-ACNC: <0.1 IU/ML
MAGNESIUM SERPL-MCNC: 2.1 MG/DL (ref 1.6–2.3)
MAGNESIUM SERPL-MCNC: 2.1 MG/DL (ref 1.6–2.3)
MAGNESIUM SERPL-MCNC: 2.2 MG/DL (ref 1.6–2.3)
MAGNESIUM SERPL-MCNC: 2.2 MG/DL (ref 1.6–2.3)
MAGNESIUM SERPL-MCNC: 2.6 MG/DL (ref 1.6–2.3)
MCH RBC QN AUTO: 29 PG (ref 26.5–33)
MCH RBC QN AUTO: 29.2 PG (ref 26.5–33)
MCH RBC QN AUTO: 29.2 PG (ref 26.5–33)
MCH RBC QN AUTO: 29.3 PG (ref 26.5–33)
MCH RBC QN AUTO: 29.4 PG (ref 26.5–33)
MCH RBC QN AUTO: 29.5 PG (ref 26.5–33)
MCHC RBC AUTO-ENTMCNC: 34.4 G/DL (ref 31.5–36.5)
MCHC RBC AUTO-ENTMCNC: 34.4 G/DL (ref 31.5–36.5)
MCHC RBC AUTO-ENTMCNC: 34.6 G/DL (ref 31.5–36.5)
MCHC RBC AUTO-ENTMCNC: 34.8 G/DL (ref 31.5–36.5)
MCHC RBC AUTO-ENTMCNC: 34.9 G/DL (ref 31.5–36.5)
MCHC RBC AUTO-ENTMCNC: 35.2 G/DL (ref 31.5–36.5)
MCV RBC AUTO: 84 FL (ref 78–100)
MCV RBC AUTO: 85 FL (ref 78–100)
MCV RBC AUTO: 85 FL (ref 78–100)
MRSA DNA SPEC QL NAA+PROBE: NEGATIVE
MUCOUS THREADS #/AREA URNS LPF: PRESENT /LPF
NITRATE UR QL: NEGATIVE
NUM BPU REQUESTED: 0
NUM BPU REQUESTED: 1
NUM BPU REQUESTED: 1
NUM BPU REQUESTED: 18
NUM BPU REQUESTED: 2
NUM BPU REQUESTED: 6
O2/TOTAL GAS SETTING VFR VENT: 100 %
O2/TOTAL GAS SETTING VFR VENT: 50 %
O2/TOTAL GAS SETTING VFR VENT: 80 %
O2/TOTAL GAS SETTING VFR VENT: NORMAL %
O2/TOTAL GAS SETTING VFR VENT: NORMAL %
OPIATES UR QL SCN: NEGATIVE
OXYHGB MFR BLD: 88 % (ref 92–100)
OXYHGB MFR BLD: 95 % (ref 92–100)
OXYHGB MFR BLD: 95 % (ref 92–100)
OXYHGB MFR BLD: 96 % (ref 92–100)
OXYHGB MFR BLD: 96 % (ref 92–100)
OXYHGB MFR BLD: 97 % (ref 92–100)
OXYHGB MFR BLD: 98 % (ref 92–100)
OXYHGB MFR BLD: NORMAL % (ref 92–100)
OXYHGB MFR BLDA: 97 % (ref 75–100)
OXYHGB MFR BLDA: 97 % (ref 75–100)
OXYHGB MFR BLDV: 56 %
OXYHGB MFR BLDV: 58 %
OXYHGB MFR BLDV: 63 %
OXYHGB MFR BLDV: 66 %
OXYHGB MFR BLDV: 87 %
OXYHGB MFR BLDV: NORMAL %
PCO2 BLD: 34 MM HG (ref 35–45)
PCO2 BLD: 38 MM HG (ref 35–45)
PCO2 BLD: 38 MM HG (ref 35–45)
PCO2 BLD: 40 MM HG (ref 35–45)
PCO2 BLD: 41 MM HG (ref 35–45)
PCO2 BLD: 41 MM HG (ref 35–45)
PCO2 BLD: 42 MM HG (ref 35–45)
PCO2 BLD: 43 MM HG (ref 35–45)
PCO2 BLD: 44 MM HG (ref 35–45)
PCO2 BLD: 45 MM HG (ref 35–45)
PCO2 BLD: 47 MM HG (ref 35–45)
PCO2 BLD: 49 MM HG (ref 35–45)
PCO2 BLD: 49 MM HG (ref 35–45)
PCO2 BLD: NORMAL MM HG (ref 35–45)
PCO2 BLDA: 29 MM HG (ref 35–45)
PCO2 BLDA: 41 MM HG (ref 35–45)
PCO2 BLDV: 35 MM HG (ref 40–50)
PCO2 BLDV: 41 MM HG (ref 40–50)
PCO2 BLDV: 48 MM HG (ref 40–50)
PCO2 BLDV: 49 MM HG (ref 40–50)
PCO2 BLDV: 50 MM HG (ref 40–50)
PCO2 BLDV: NORMAL MM HG (ref 40–50)
PH BLD: 7.34 PH (ref 7.35–7.45)
PH BLD: 7.38 PH (ref 7.35–7.45)
PH BLD: 7.4 PH (ref 7.35–7.45)
PH BLD: 7.41 PH (ref 7.35–7.45)
PH BLD: 7.43 PH (ref 7.35–7.45)
PH BLD: 7.43 PH (ref 7.35–7.45)
PH BLD: 7.44 PH (ref 7.35–7.45)
PH BLD: 7.44 PH (ref 7.35–7.45)
PH BLD: 7.45 PH (ref 7.35–7.45)
PH BLD: 7.49 PH (ref 7.35–7.45)
PH BLD: NORMAL PH (ref 7.35–7.45)
PH BLDA: 7.48 PH (ref 7.35–7.45)
PH BLDA: 7.51 PH (ref 7.35–7.45)
PH BLDV: 7.4 PH (ref 7.32–7.43)
PH BLDV: 7.41 PH (ref 7.32–7.43)
PH BLDV: 7.42 PH (ref 7.32–7.43)
PH BLDV: 7.43 PH (ref 7.32–7.43)
PH BLDV: 7.44 PH (ref 7.32–7.43)
PH BLDV: NORMAL PH (ref 7.32–7.43)
PH UR STRIP: 5 PH (ref 5–7)
PHOSPHATE SERPL-MCNC: 1.4 MG/DL (ref 2.5–4.5)
PHOSPHATE SERPL-MCNC: 1.9 MG/DL (ref 2.5–4.5)
PHOSPHATE SERPL-MCNC: 3.8 MG/DL (ref 2.5–4.5)
PHOSPHATE SERPL-MCNC: 5.7 MG/DL (ref 2.5–4.5)
PHOSPHATE SERPL-MCNC: 7.7 MG/DL (ref 2.5–4.5)
PLATELET # BLD AUTO: 102 10E9/L (ref 150–450)
PLATELET # BLD AUTO: 106 10E9/L (ref 150–450)
PLATELET # BLD AUTO: 58 10E9/L (ref 150–450)
PLATELET # BLD AUTO: 69 10E9/L (ref 150–450)
PLATELET # BLD AUTO: 71 10E9/L (ref 150–450)
PLATELET # BLD AUTO: 98 10E9/L (ref 150–450)
PO2 BLD: 109 MM HG (ref 80–105)
PO2 BLD: 138 MM HG (ref 80–105)
PO2 BLD: 145 MM HG (ref 80–105)
PO2 BLD: 145 MM HG (ref 80–105)
PO2 BLD: 184 MM HG (ref 80–105)
PO2 BLD: 215 MM HG (ref 80–105)
PO2 BLD: 218 MM HG (ref 80–105)
PO2 BLD: 238 MM HG (ref 80–105)
PO2 BLD: 374 MM HG (ref 80–105)
PO2 BLD: 389 MM HG (ref 80–105)
PO2 BLD: 397 MM HG (ref 80–105)
PO2 BLD: 406 MM HG (ref 80–105)
PO2 BLD: 465 MM HG (ref 80–105)
PO2 BLD: 52 MM HG (ref 80–105)
PO2 BLD: 87 MM HG (ref 80–105)
PO2 BLD: NORMAL MM HG (ref 80–105)
PO2 BLDA: 241 MM HG (ref 80–105)
PO2 BLDA: 468 MM HG (ref 80–105)
PO2 BLDV: 28 MM HG (ref 25–47)
PO2 BLDV: 30 MM HG (ref 25–47)
PO2 BLDV: 33 MM HG (ref 25–47)
PO2 BLDV: 33 MM HG (ref 25–47)
PO2 BLDV: 51 MM HG (ref 25–47)
PO2 BLDV: NORMAL MM HG (ref 25–47)
POTASSIUM BLD-SCNC: NORMAL MMOL/L (ref 3.4–5.3)
POTASSIUM SERPL-SCNC: 2.4 MMOL/L (ref 3.4–5.3)
POTASSIUM SERPL-SCNC: 2.6 MMOL/L (ref 3.4–5.3)
POTASSIUM SERPL-SCNC: 3.4 MMOL/L (ref 3.4–5.3)
POTASSIUM SERPL-SCNC: 4.2 MMOL/L (ref 3.4–5.3)
POTASSIUM SERPL-SCNC: 5.9 MMOL/L (ref 3.4–5.3)
POTASSIUM SERPL-SCNC: 6.5 MMOL/L (ref 3.4–5.3)
POTASSIUM SERPL-SCNC: 6.9 MMOL/L (ref 3.4–5.3)
PROT SERPL-MCNC: 3.5 G/DL (ref 6.8–8.8)
PROT SERPL-MCNC: 4 G/DL (ref 6.8–8.8)
PROT SERPL-MCNC: 4 G/DL (ref 6.8–8.8)
PROT SERPL-MCNC: 4.1 G/DL (ref 6.8–8.8)
PROT SERPL-MCNC: 4.2 G/DL (ref 6.8–8.8)
RBC # BLD AUTO: 2.17 10E12/L (ref 4.4–5.9)
RBC # BLD AUTO: 2.55 10E12/L (ref 4.4–5.9)
RBC # BLD AUTO: 2.63 10E12/L (ref 4.4–5.9)
RBC # BLD AUTO: 2.77 10E12/L (ref 4.4–5.9)
RBC # BLD AUTO: 2.97 10E12/L (ref 4.4–5.9)
RBC # BLD AUTO: 3.12 10E12/L (ref 4.4–5.9)
RBC #/AREA URNS AUTO: 0 /HPF (ref 0–2)
SODIUM SERPL-SCNC: 155 MMOL/L (ref 133–144)
SODIUM SERPL-SCNC: 155 MMOL/L (ref 133–144)
SODIUM SERPL-SCNC: 156 MMOL/L (ref 133–144)
SODIUM SERPL-SCNC: 156 MMOL/L (ref 133–144)
SODIUM SERPL-SCNC: 157 MMOL/L (ref 133–144)
SODIUM SERPL-SCNC: 158 MMOL/L (ref 133–144)
SOURCE: ABNORMAL
SP GR UR STRIP: 1.02 (ref 1–1.03)
SPECIMEN EXP DATE BLD: NORMAL
SPECIMEN SOURCE: NORMAL
SQUAMOUS #/AREA URNS AUTO: <1 /HPF (ref 0–1)
TRANSFUSION STATUS PATIENT QL: NORMAL
TROPONIN I SERPL-MCNC: 12.11 UG/L (ref 0–0.04)
TROPONIN I SERPL-MCNC: 12.29 UG/L (ref 0–0.04)
TROPONIN I SERPL-MCNC: 13.23 UG/L (ref 0–0.04)
TROPONIN I SERPL-MCNC: 13.61 UG/L (ref 0–0.04)
TROPONIN I SERPL-MCNC: 14.52 UG/L (ref 0–0.04)
UROBILINOGEN UR STRIP-MCNC: NORMAL MG/DL (ref 0–2)
VANCOMYCIN SERPL-MCNC: 11.5 MG/L
WBC # BLD AUTO: 6.1 10E9/L (ref 4–11)
WBC # BLD AUTO: 6.5 10E9/L (ref 4–11)
WBC # BLD AUTO: 6.8 10E9/L (ref 4–11)
WBC # BLD AUTO: 7.8 10E9/L (ref 4–11)
WBC # BLD AUTO: 8.4 10E9/L (ref 4–11)
WBC # BLD AUTO: 8.5 10E9/L (ref 4–11)
WBC #/AREA URNS AUTO: 1 /HPF (ref 0–5)

## 2018-08-09 PROCEDURE — 25000125 ZZHC RX 250: Performed by: INTERNAL MEDICINE

## 2018-08-09 PROCEDURE — 85347 COAGULATION TIME ACTIVATED: CPT

## 2018-08-09 PROCEDURE — 80202 ASSAY OF VANCOMYCIN: CPT | Performed by: INTERNAL MEDICINE

## 2018-08-09 PROCEDURE — 85520 HEPARIN ASSAY: CPT | Performed by: STUDENT IN AN ORGANIZED HEALTH CARE EDUCATION/TRAINING PROGRAM

## 2018-08-09 PROCEDURE — 82330 ASSAY OF CALCIUM: CPT | Performed by: STUDENT IN AN ORGANIZED HEALTH CARE EDUCATION/TRAINING PROGRAM

## 2018-08-09 PROCEDURE — 82805 BLOOD GASES W/O2 SATURATION: CPT | Performed by: THORACIC SURGERY (CARDIOTHORACIC VASCULAR SURGERY)

## 2018-08-09 PROCEDURE — 87040 BLOOD CULTURE FOR BACTERIA: CPT | Performed by: STUDENT IN AN ORGANIZED HEALTH CARE EDUCATION/TRAINING PROGRAM

## 2018-08-09 PROCEDURE — 40000275 ZZH STATISTIC RCP TIME EA 10 MIN

## 2018-08-09 PROCEDURE — 25000128 H RX IP 250 OP 636: Performed by: THORACIC SURGERY (CARDIOTHORACIC VASCULAR SURGERY)

## 2018-08-09 PROCEDURE — 25000128 H RX IP 250 OP 636: Performed by: INTERNAL MEDICINE

## 2018-08-09 PROCEDURE — 86140 C-REACTIVE PROTEIN: CPT | Performed by: STUDENT IN AN ORGANIZED HEALTH CARE EDUCATION/TRAINING PROGRAM

## 2018-08-09 PROCEDURE — 71045 X-RAY EXAM CHEST 1 VIEW: CPT

## 2018-08-09 PROCEDURE — P9037 PLATE PHERES LEUKOREDU IRRAD: HCPCS | Performed by: THORACIC SURGERY (CARDIOTHORACIC VASCULAR SURGERY)

## 2018-08-09 PROCEDURE — 84100 ASSAY OF PHOSPHORUS: CPT | Performed by: STUDENT IN AN ORGANIZED HEALTH CARE EDUCATION/TRAINING PROGRAM

## 2018-08-09 PROCEDURE — 83735 ASSAY OF MAGNESIUM: CPT | Performed by: STUDENT IN AN ORGANIZED HEALTH CARE EDUCATION/TRAINING PROGRAM

## 2018-08-09 PROCEDURE — 84484 ASSAY OF TROPONIN QUANT: CPT | Performed by: STUDENT IN AN ORGANIZED HEALTH CARE EDUCATION/TRAINING PROGRAM

## 2018-08-09 PROCEDURE — 99292 CRITICAL CARE ADDL 30 MIN: CPT | Mod: GC | Performed by: INTERNAL MEDICINE

## 2018-08-09 PROCEDURE — 85027 COMPLETE CBC AUTOMATED: CPT | Performed by: THORACIC SURGERY (CARDIOTHORACIC VASCULAR SURGERY)

## 2018-08-09 PROCEDURE — 25000132 ZZH RX MED GY IP 250 OP 250 PS 637: Performed by: INTERNAL MEDICINE

## 2018-08-09 PROCEDURE — 82947 ASSAY GLUCOSE BLOOD QUANT: CPT | Performed by: STUDENT IN AN ORGANIZED HEALTH CARE EDUCATION/TRAINING PROGRAM

## 2018-08-09 PROCEDURE — 40000986 XR CHEST PORT 1 VW

## 2018-08-09 PROCEDURE — 33949 ECMO/ECLS DAILY MGMT ARTERY: CPT

## 2018-08-09 PROCEDURE — G0463 HOSPITAL OUTPT CLINIC VISIT: HCPCS

## 2018-08-09 PROCEDURE — 87070 CULTURE OTHR SPECIMN AEROBIC: CPT | Performed by: STUDENT IN AN ORGANIZED HEALTH CARE EDUCATION/TRAINING PROGRAM

## 2018-08-09 PROCEDURE — 27211327 ZZ H KIT ECMO CIRCUIT ONLY, ADULT

## 2018-08-09 PROCEDURE — 83615 LACTATE (LD) (LDH) ENZYME: CPT | Performed by: STUDENT IN AN ORGANIZED HEALTH CARE EDUCATION/TRAINING PROGRAM

## 2018-08-09 PROCEDURE — 40000196 ZZH STATISTIC RAPCV CVP MONITORING

## 2018-08-09 PROCEDURE — P9059 PLASMA, FRZ BETWEEN 8-24HOUR: HCPCS | Performed by: THORACIC SURGERY (CARDIOTHORACIC VASCULAR SURGERY)

## 2018-08-09 PROCEDURE — 87640 STAPH A DNA AMP PROBE: CPT | Performed by: THORACIC SURGERY (CARDIOTHORACIC VASCULAR SURGERY)

## 2018-08-09 PROCEDURE — 25000128 H RX IP 250 OP 636: Performed by: STUDENT IN AN ORGANIZED HEALTH CARE EDUCATION/TRAINING PROGRAM

## 2018-08-09 PROCEDURE — 81001 URINALYSIS AUTO W/SCOPE: CPT | Performed by: STUDENT IN AN ORGANIZED HEALTH CARE EDUCATION/TRAINING PROGRAM

## 2018-08-09 PROCEDURE — 27210794 ZZH OR GENERAL SUPPLY STERILE: Performed by: THORACIC SURGERY (CARDIOTHORACIC VASCULAR SURGERY)

## 2018-08-09 PROCEDURE — 80307 DRUG TEST PRSMV CHEM ANLYZR: CPT | Performed by: STUDENT IN AN ORGANIZED HEALTH CARE EDUCATION/TRAINING PROGRAM

## 2018-08-09 PROCEDURE — 85610 PROTHROMBIN TIME: CPT | Performed by: THORACIC SURGERY (CARDIOTHORACIC VASCULAR SURGERY)

## 2018-08-09 PROCEDURE — 80320 DRUG SCREEN QUANTALCOHOLS: CPT | Performed by: STUDENT IN AN ORGANIZED HEALTH CARE EDUCATION/TRAINING PROGRAM

## 2018-08-09 PROCEDURE — 87040 BLOOD CULTURE FOR BACTERIA: CPT | Performed by: INTERNAL MEDICINE

## 2018-08-09 PROCEDURE — 82805 BLOOD GASES W/O2 SATURATION: CPT | Performed by: STUDENT IN AN ORGANIZED HEALTH CARE EDUCATION/TRAINING PROGRAM

## 2018-08-09 PROCEDURE — 83605 ASSAY OF LACTIC ACID: CPT | Performed by: THORACIC SURGERY (CARDIOTHORACIC VASCULAR SURGERY)

## 2018-08-09 PROCEDURE — 3E1Y38Z IRRIGATION OF PERICARDIAL CAVITY USING IRRIGATING SUBSTANCE, PERCUTANEOUS APPROACH: ICD-10-PCS | Performed by: THORACIC SURGERY (CARDIOTHORACIC VASCULAR SURGERY)

## 2018-08-09 PROCEDURE — 83605 ASSAY OF LACTIC ACID: CPT | Performed by: STUDENT IN AN ORGANIZED HEALTH CARE EDUCATION/TRAINING PROGRAM

## 2018-08-09 PROCEDURE — 27210995 ZZH RX 272: Performed by: THORACIC SURGERY (CARDIOTHORACIC VASCULAR SURGERY)

## 2018-08-09 PROCEDURE — 83051 HEMOGLOBIN PLASMA: CPT | Performed by: STUDENT IN AN ORGANIZED HEALTH CARE EDUCATION/TRAINING PROGRAM

## 2018-08-09 PROCEDURE — 85027 COMPLETE CBC AUTOMATED: CPT | Performed by: STUDENT IN AN ORGANIZED HEALTH CARE EDUCATION/TRAINING PROGRAM

## 2018-08-09 PROCEDURE — 25000125 ZZHC RX 250: Performed by: GENERAL ACUTE CARE HOSPITAL

## 2018-08-09 PROCEDURE — 27211402 ZZH SENSOR NIRS OXIMETER, ADULT

## 2018-08-09 PROCEDURE — P9037 PLATE PHERES LEUKOREDU IRRAD: HCPCS | Performed by: STUDENT IN AN ORGANIZED HEALTH CARE EDUCATION/TRAINING PROGRAM

## 2018-08-09 PROCEDURE — 40000014 ZZH STATISTIC ARTERIAL MONITORING DAILY

## 2018-08-09 PROCEDURE — 85610 PROTHROMBIN TIME: CPT | Performed by: STUDENT IN AN ORGANIZED HEALTH CARE EDUCATION/TRAINING PROGRAM

## 2018-08-09 PROCEDURE — 85300 ANTITHROMBIN III ACTIVITY: CPT | Performed by: STUDENT IN AN ORGANIZED HEALTH CARE EDUCATION/TRAINING PROGRAM

## 2018-08-09 PROCEDURE — 99223 1ST HOSP IP/OBS HIGH 75: CPT | Performed by: CLINICAL NURSE SPECIALIST

## 2018-08-09 PROCEDURE — P9016 RBC LEUKOCYTES REDUCED: HCPCS | Performed by: THORACIC SURGERY (CARDIOTHORACIC VASCULAR SURGERY)

## 2018-08-09 PROCEDURE — 82805 BLOOD GASES W/O2 SATURATION: CPT | Performed by: INTERNAL MEDICINE

## 2018-08-09 PROCEDURE — 85730 THROMBOPLASTIN TIME PARTIAL: CPT | Performed by: STUDENT IN AN ORGANIZED HEALTH CARE EDUCATION/TRAINING PROGRAM

## 2018-08-09 PROCEDURE — P9059 PLASMA, FRZ BETWEEN 8-24HOUR: HCPCS | Performed by: STUDENT IN AN ORGANIZED HEALTH CARE EDUCATION/TRAINING PROGRAM

## 2018-08-09 PROCEDURE — 85379 FIBRIN DEGRADATION QUANT: CPT | Performed by: STUDENT IN AN ORGANIZED HEALTH CARE EDUCATION/TRAINING PROGRAM

## 2018-08-09 PROCEDURE — 85384 FIBRINOGEN ACTIVITY: CPT | Performed by: STUDENT IN AN ORGANIZED HEALTH CARE EDUCATION/TRAINING PROGRAM

## 2018-08-09 PROCEDURE — 76000 FLUOROSCOPY <1 HR PHYS/QHP: CPT

## 2018-08-09 PROCEDURE — 93925 LOWER EXTREMITY STUDY: CPT

## 2018-08-09 PROCEDURE — 25000125 ZZHC RX 250: Performed by: THORACIC SURGERY (CARDIOTHORACIC VASCULAR SURGERY)

## 2018-08-09 PROCEDURE — P9041 ALBUMIN (HUMAN),5%, 50ML: HCPCS | Performed by: INTERNAL MEDICINE

## 2018-08-09 PROCEDURE — 03WY3YZ REVISION OF OTHER DEVICE IN UPPER ARTERY, PERCUTANEOUS APPROACH: ICD-10-PCS | Performed by: INTERNAL MEDICINE

## 2018-08-09 PROCEDURE — 94003 VENT MGMT INPAT SUBQ DAY: CPT

## 2018-08-09 PROCEDURE — 85652 RBC SED RATE AUTOMATED: CPT | Performed by: STUDENT IN AN ORGANIZED HEALTH CARE EDUCATION/TRAINING PROGRAM

## 2018-08-09 PROCEDURE — C9460 INJECTION, CANGRELOR: HCPCS | Performed by: INTERNAL MEDICINE

## 2018-08-09 PROCEDURE — 95951 ZZHC EEG VIDEO < 12 HR: CPT | Mod: 52,ZF

## 2018-08-09 PROCEDURE — 25000125 ZZHC RX 250: Performed by: STUDENT IN AN ORGANIZED HEALTH CARE EDUCATION/TRAINING PROGRAM

## 2018-08-09 PROCEDURE — 99291 CRITICAL CARE FIRST HOUR: CPT | Mod: GC | Performed by: INTERNAL MEDICINE

## 2018-08-09 PROCEDURE — 25800025 ZZH RX 258: Performed by: INTERNAL MEDICINE

## 2018-08-09 PROCEDURE — 80053 COMPREHEN METABOLIC PANEL: CPT | Performed by: STUDENT IN AN ORGANIZED HEALTH CARE EDUCATION/TRAINING PROGRAM

## 2018-08-09 PROCEDURE — 93010 ELECTROCARDIOGRAM REPORT: CPT | Performed by: INTERNAL MEDICINE

## 2018-08-09 PROCEDURE — 40000344 ZZHCL STATISTIC THAWING COMPONENT: Performed by: THORACIC SURGERY (CARDIOTHORACIC VASCULAR SURGERY)

## 2018-08-09 PROCEDURE — 00000146 ZZHCL STATISTIC GLUCOSE BY METER IP

## 2018-08-09 PROCEDURE — 40000048 ZZH STATISTIC DAILY SWAN MONITORING

## 2018-08-09 PROCEDURE — 36415 COLL VENOUS BLD VENIPUNCTURE: CPT | Performed by: INTERNAL MEDICINE

## 2018-08-09 PROCEDURE — 27211329 ZZ H DEVICE OXYGENATOR QUADROX ECMO, ADULT

## 2018-08-09 PROCEDURE — 20000004 ZZH R&B ICU UMMC

## 2018-08-09 PROCEDURE — 40000344 ZZHCL STATISTIC THAWING COMPONENT: Performed by: STUDENT IN AN ORGANIZED HEALTH CARE EDUCATION/TRAINING PROGRAM

## 2018-08-09 PROCEDURE — 36000062 ZZH SURGERY LEVEL 4 1ST 30 MIN - UMMC: Performed by: THORACIC SURGERY (CARDIOTHORACIC VASCULAR SURGERY)

## 2018-08-09 PROCEDURE — 82330 ASSAY OF CALCIUM: CPT | Performed by: THORACIC SURGERY (CARDIOTHORACIC VASCULAR SURGERY)

## 2018-08-09 PROCEDURE — 87205 SMEAR GRAM STAIN: CPT | Performed by: STUDENT IN AN ORGANIZED HEALTH CARE EDUCATION/TRAINING PROGRAM

## 2018-08-09 PROCEDURE — 80048 BASIC METABOLIC PNL TOTAL CA: CPT | Performed by: THORACIC SURGERY (CARDIOTHORACIC VASCULAR SURGERY)

## 2018-08-09 PROCEDURE — P9040 RBC LEUKOREDUCED IRRADIATED: HCPCS | Performed by: THORACIC SURGERY (CARDIOTHORACIC VASCULAR SURGERY)

## 2018-08-09 PROCEDURE — 82803 BLOOD GASES ANY COMBINATION: CPT | Performed by: THORACIC SURGERY (CARDIOTHORACIC VASCULAR SURGERY)

## 2018-08-09 PROCEDURE — 84132 ASSAY OF SERUM POTASSIUM: CPT | Performed by: THORACIC SURGERY (CARDIOTHORACIC VASCULAR SURGERY)

## 2018-08-09 PROCEDURE — 85384 FIBRINOGEN ACTIVITY: CPT | Performed by: THORACIC SURGERY (CARDIOTHORACIC VASCULAR SURGERY)

## 2018-08-09 PROCEDURE — 87641 MR-STAPH DNA AMP PROBE: CPT | Performed by: THORACIC SURGERY (CARDIOTHORACIC VASCULAR SURGERY)

## 2018-08-09 RX ORDER — ALBUMIN, HUMAN INJ 5% 5 %
12.5 SOLUTION INTRAVENOUS ONCE
Status: COMPLETED | OUTPATIENT
Start: 2018-08-09 | End: 2018-08-09

## 2018-08-09 RX ORDER — POTASSIUM CHLORIDE 29.8 MG/ML
20 INJECTION INTRAVENOUS
Status: DISCONTINUED | OUTPATIENT
Start: 2018-08-09 | End: 2018-08-10 | Stop reason: HOSPADM

## 2018-08-09 RX ORDER — DEXTROSE MONOHYDRATE 25 G/50ML
25 INJECTION, SOLUTION INTRAVENOUS ONCE
Status: COMPLETED | OUTPATIENT
Start: 2018-08-09 | End: 2018-08-09

## 2018-08-09 RX ORDER — FUROSEMIDE 10 MG/ML
40 INJECTION INTRAMUSCULAR; INTRAVENOUS ONCE
Status: COMPLETED | OUTPATIENT
Start: 2018-08-09 | End: 2018-08-09

## 2018-08-09 RX ORDER — POTASSIUM CHLORIDE 29.8 MG/ML
20 INJECTION INTRAVENOUS EVERY 6 HOURS PRN
Status: DISCONTINUED | OUTPATIENT
Start: 2018-08-09 | End: 2018-08-10 | Stop reason: HOSPADM

## 2018-08-09 RX ORDER — POTASSIUM CHLORIDE 7.45 MG/ML
10 INJECTION INTRAVENOUS
Status: DISCONTINUED | OUTPATIENT
Start: 2018-08-09 | End: 2018-08-10 | Stop reason: HOSPADM

## 2018-08-09 RX ORDER — POTASSIUM CL/LIDO/0.9 % NACL 10MEQ/0.1L
10 INTRAVENOUS SOLUTION, PIGGYBACK (ML) INTRAVENOUS
Status: DISCONTINUED | OUTPATIENT
Start: 2018-08-09 | End: 2018-08-09 | Stop reason: RX

## 2018-08-09 RX ORDER — DEXTROSE MONOHYDRATE 100 MG/ML
INJECTION, SOLUTION INTRAVENOUS CONTINUOUS
Status: ACTIVE | OUTPATIENT
Start: 2018-08-09 | End: 2018-08-09

## 2018-08-09 RX ORDER — MAGNESIUM SULFATE HEPTAHYDRATE 40 MG/ML
2 INJECTION, SOLUTION INTRAVENOUS DAILY PRN
Status: DISCONTINUED | OUTPATIENT
Start: 2018-08-09 | End: 2018-08-10 | Stop reason: HOSPADM

## 2018-08-09 RX ORDER — MAGNESIUM SULFATE HEPTAHYDRATE 40 MG/ML
2 INJECTION, SOLUTION INTRAVENOUS EVERY 6 HOURS PRN
Status: DISCONTINUED | OUTPATIENT
Start: 2018-08-09 | End: 2018-08-10 | Stop reason: HOSPADM

## 2018-08-09 RX ORDER — MAGNESIUM HYDROXIDE 1200 MG/15ML
LIQUID ORAL PRN
Status: DISCONTINUED | OUTPATIENT
Start: 2018-08-09 | End: 2018-08-10 | Stop reason: HOSPADM

## 2018-08-09 RX ORDER — POTASSIUM CHLORIDE 1.5 G/1.58G
20-40 POWDER, FOR SOLUTION ORAL
Status: DISCONTINUED | OUTPATIENT
Start: 2018-08-09 | End: 2018-08-10 | Stop reason: HOSPADM

## 2018-08-09 RX ORDER — POTASSIUM CHLORIDE 750 MG/1
20-40 TABLET, EXTENDED RELEASE ORAL
Status: DISCONTINUED | OUTPATIENT
Start: 2018-08-09 | End: 2018-08-10 | Stop reason: HOSPADM

## 2018-08-09 RX ORDER — MAGNESIUM SULFATE HEPTAHYDRATE 40 MG/ML
4 INJECTION, SOLUTION INTRAVENOUS EVERY 4 HOURS PRN
Status: DISCONTINUED | OUTPATIENT
Start: 2018-08-09 | End: 2018-08-10 | Stop reason: HOSPADM

## 2018-08-09 RX ADMIN — POTASSIUM CHLORIDE 20 MEQ: 400 INJECTION, SOLUTION INTRAVENOUS at 00:45

## 2018-08-09 RX ADMIN — HUMAN INSULIN 10 UNITS/HR: 100 INJECTION, SOLUTION SUBCUTANEOUS at 02:57

## 2018-08-09 RX ADMIN — SODIUM BICARBONATE 50 MEQ: 84 INJECTION INTRAVENOUS at 19:04

## 2018-08-09 RX ADMIN — HEPARIN SODIUM 3 ML/HR: 1000 INJECTION, SOLUTION INTRAVENOUS; SUBCUTANEOUS at 00:50

## 2018-08-09 RX ADMIN — VASOPRESSIN 4 UNITS/HR: 20 INJECTION INTRAVENOUS at 01:28

## 2018-08-09 RX ADMIN — PANTOPRAZOLE SODIUM 40 MG: 40 INJECTION, POWDER, FOR SOLUTION INTRAVENOUS at 07:23

## 2018-08-09 RX ADMIN — HUMAN INSULIN 15 UNITS/HR: 100 INJECTION, SOLUTION SUBCUTANEOUS at 05:33

## 2018-08-09 RX ADMIN — MINERAL OIL AND WHITE PETROLATUM: 30; 940 OINTMENT OPHTHALMIC at 16:18

## 2018-08-09 RX ADMIN — PIPERACILLIN AND TAZOBACTAM 3.38 G: 3; .375 INJECTION, POWDER, LYOPHILIZED, FOR SOLUTION INTRAVENOUS; PARENTERAL at 13:00

## 2018-08-09 RX ADMIN — ANGIOTENSIN II 5 NG/KG/MIN: 2.5 INJECTION INTRAVENOUS at 00:14

## 2018-08-09 RX ADMIN — Medication 12.5 ML/KG/HR: at 21:13

## 2018-08-09 RX ADMIN — DEXTROSE MONOHYDRATE 2 MCG/KG/MIN: 50 INJECTION, SOLUTION INTRAVENOUS at 15:43

## 2018-08-09 RX ADMIN — PIPERACILLIN AND TAZOBACTAM 3.38 G: 3; .375 INJECTION, POWDER, LYOPHILIZED, FOR SOLUTION INTRAVENOUS; PARENTERAL at 00:59

## 2018-08-09 RX ADMIN — VASOPRESSIN 4 UNITS/HR: 20 INJECTION INTRAVENOUS at 12:59

## 2018-08-09 RX ADMIN — POTASSIUM CHLORIDE 20 MEQ: 400 INJECTION, SOLUTION INTRAVENOUS at 05:24

## 2018-08-09 RX ADMIN — HUMAN INSULIN 20 UNITS/HR: 100 INJECTION, SOLUTION SUBCUTANEOUS at 10:52

## 2018-08-09 RX ADMIN — POTASSIUM CHLORIDE 20 MEQ: 400 INJECTION, SOLUTION INTRAVENOUS at 09:03

## 2018-08-09 RX ADMIN — VANCOMYCIN HYDROCHLORIDE 2250 MG: 10 INJECTION, POWDER, LYOPHILIZED, FOR SOLUTION INTRAVENOUS at 03:03

## 2018-08-09 RX ADMIN — HEPARIN SODIUM 750 UNITS/HR: 10000 INJECTION, SOLUTION INTRAVENOUS at 12:41

## 2018-08-09 RX ADMIN — HUMAN INSULIN 15 UNITS/HR: 100 INJECTION, SOLUTION SUBCUTANEOUS at 07:23

## 2018-08-09 RX ADMIN — ALBUMIN HUMAN 12.5 G: 0.05 INJECTION, SOLUTION INTRAVENOUS at 10:20

## 2018-08-09 RX ADMIN — PIPERACILLIN AND TAZOBACTAM 3.38 G: 3; .375 INJECTION, POWDER, LYOPHILIZED, FOR SOLUTION INTRAVENOUS; PARENTERAL at 17:19

## 2018-08-09 RX ADMIN — MINERAL OIL AND WHITE PETROLATUM: 30; 940 OINTMENT OPHTHALMIC at 07:23

## 2018-08-09 RX ADMIN — ALBUMIN HUMAN 12.5 G: 0.05 INJECTION, SOLUTION INTRAVENOUS at 10:17

## 2018-08-09 RX ADMIN — POTASSIUM CHLORIDE 20 MEQ: 400 INJECTION, SOLUTION INTRAVENOUS at 03:00

## 2018-08-09 RX ADMIN — CANGRELOR 0.75 MCG/KG/MIN: 50 INJECTION, POWDER, LYOPHILIZED, FOR SOLUTION INTRAVENOUS at 14:10

## 2018-08-09 RX ADMIN — HUMAN INSULIN 20 UNITS/HR: 100 INJECTION, SOLUTION SUBCUTANEOUS at 09:20

## 2018-08-09 RX ADMIN — HUMAN INSULIN 9 UNITS: 100 INJECTION, SOLUTION SUBCUTANEOUS at 19:01

## 2018-08-09 RX ADMIN — POTASSIUM CHLORIDE 20 MEQ: 400 INJECTION, SOLUTION INTRAVENOUS at 10:27

## 2018-08-09 RX ADMIN — CALCIUM CHLORIDE 1 G: 100 INJECTION, SOLUTION INTRAVENOUS at 19:01

## 2018-08-09 RX ADMIN — SODIUM PHOSPHATE, MONOBASIC, MONOHYDRATE AND SODIUM PHOSPHATE, DIBASIC, ANHYDROUS 20 MMOL: 276; 142 INJECTION, SOLUTION INTRAVENOUS at 04:52

## 2018-08-09 RX ADMIN — VANCOMYCIN HYDROCHLORIDE 2500 MG: 10 INJECTION, POWDER, LYOPHILIZED, FOR SOLUTION INTRAVENOUS at 22:39

## 2018-08-09 RX ADMIN — POTASSIUM CHLORIDE 20 MEQ: 400 INJECTION, SOLUTION INTRAVENOUS at 03:55

## 2018-08-09 RX ADMIN — DEXTROSE MONOHYDRATE 25 G: 25 INJECTION, SOLUTION INTRAVENOUS at 19:04

## 2018-08-09 RX ADMIN — Medication: at 21:13

## 2018-08-09 RX ADMIN — PIPERACILLIN AND TAZOBACTAM 3.38 G: 3; .375 INJECTION, POWDER, LYOPHILIZED, FOR SOLUTION INTRAVENOUS; PARENTERAL at 07:23

## 2018-08-09 RX ADMIN — ANGIOTENSIN II 5 NG/KG/MIN: 2.5 INJECTION INTRAVENOUS at 00:13

## 2018-08-09 RX ADMIN — SODIUM CHLORIDE 1000 ML: 9 INJECTION, SOLUTION INTRAVENOUS at 18:56

## 2018-08-09 RX ADMIN — POTASSIUM PHOSPHATE, MONOBASIC AND POTASSIUM PHOSPHATE, DIBASIC 20 MMOL: 224; 236 INJECTION, SOLUTION INTRAVENOUS at 11:28

## 2018-08-09 RX ADMIN — FUROSEMIDE 40 MG: 10 INJECTION, SOLUTION INTRAVENOUS at 19:04

## 2018-08-09 RX ADMIN — DEXTROSE MONOHYDRATE: 100 INJECTION, SOLUTION INTRAVENOUS at 18:56

## 2018-08-09 RX ADMIN — FENTANYL CITRATE 100 MCG/HR: 50 INJECTION, SOLUTION INTRAMUSCULAR; INTRAVENOUS at 18:47

## 2018-08-09 RX ADMIN — Medication 6 MG/HR: at 16:44

## 2018-08-09 RX ADMIN — SODIUM BICARBONATE: 84 INJECTION, SOLUTION INTRAVENOUS at 01:40

## 2018-08-09 ASSESSMENT — ACTIVITIES OF DAILY LIVING (ADL)
ADLS_ACUITY_SCORE: 17
ADLS_ACUITY_SCORE: 17
ADLS_ACUITY_SCORE: 16
ADLS_ACUITY_SCORE: 20

## 2018-08-09 NOTE — PHARMACY-VANCOMYCIN DOSING SERVICE
"Pharmacy Vancomycin Consult Initial Note    Date of Service 2018  Patient's  1940  78 year old, male    Indication: Open sternotomy    Current estimated CrCl = Estimated Creatinine Clearance: 86.7 mL/min (based on Cr of 0.8).    Creatinine for last 3 days  2018: 10:29 PM Creatinine 0.80 mg/dL    Recent vancomycin level(s) for last 3 days  No results found for requested labs within last 72 hours.    Body mass index is 28.23 kg/(m^2).  Height is 5' 11\"[per OSH[.   Wt Readings from Last 2 Encounters:   18 91.8 kg (202 lb 6.1 oz)         Vancomycin IV Administrations (past 72 hours)      No vancomycin orders with administrations in past 72 hours.              Nephrotoxins and other renal medications (Future)    Start     Dose/Rate Route Frequency Ordered Stop    18 0000  piperacillin-tazobactam (ZOSYN) 3.375 g vial to attach to  mL bag      3.375 g  over 30 Minutes Intravenous EVERY 6 HOURS 18 0000  vancomycin (VANCOCIN) 2,250 mg in sodium chloride 0.9 % 250 mL intermittent infusion      2,250 mg (central catheter)  over 60 Minutes Intravenous ONCE 18 231  vancomycin place rollins - receiving intermittent dosing      1 each Does not apply SEE ADMIN INSTRUCTIONS 18  norepinephrine (LEVOPHED) 16 mg in D5W 250 mL infusion      0.03-0.4 mcg/kg/min × 91.8 kg (Dosing Weight)  2.6-34.4 mL/hr  Intravenous CONTINUOUS 18  vasopressin (VASOSTRICT) 40 Units in D5W 40 mL infusion      0.5-4 Units/hr  0.5-4 mL/hr  Intravenous CONTINUOUS 18  phenylephrine (TAMMIE-SYNEPHRINE) 50 mg in sodium chloride 0.9 % 250 mL infusion      0.5-6 mcg/kg/min × 91.8 kg (Dosing Weight)  13.8-165.2 mL/hr  Intravenous CONTINUOUS 18          Contrast Orders - past 72 hours     None          Plan:  1.  Start vancomycin 2250 mg IV x 1 followed by intermittent dosing based " on vancomycin levels. Will redose vancomycin once level confirmed or estimated to be <20 mg/L.  2.  Goal Trough Level: 15-20 mg/L   3.  Pharmacy will check trough levels as appropriate in 1-3 Days.    4.  Serum creatinine levels will be ordered daily for the first week of therapy and at least twice weekly for subsequent weeks.    5.  Carlton method utilized to dose vancomycin therapy: intermittent dosing as I am unable to assess trends in renal function at this time.    Pati Kumar, PharmD  August 8, 2018

## 2018-08-09 NOTE — PROGRESS NOTES
ECMO Shift Summary:    Patient remains on VA ECMO, all equipment is functioning and alarms are appropriately set. RPM's 3800 with flow range 3.6-4.0 L/min. Sweep gas is at 4 LPM and FiO2 60%. Circuit remains free of air, clot and fibrin. Cannulas are secure with no bleeding from site. Extremities are cool. Suctioned ETT for scant tan thick.    Significant Shift Events: Most recent CXR read: Rapidly accumulating hematoma in the superior aspect of the left  pleural space.    Vent settings:  Ventilation Mode: CMV/AC  (Continuous Mandatory Ventilation/ Assist Control)  FiO2 (%): 50 %  Rate Set (breaths/minute): 16 breaths/min  Tidal Volume Set (mL): 450 mL  PEEP (cm H2O): 7 cmH2O  Oxygen Concentration (%): 50 %  Resp: 16.    Heparin is running at 650 u/hr, ACT range 119-1164.    Urine output is fair, blood loss was small. Product given included 2 units of PRBCs, 2 units of Platelets, 1 unit of FFP, and 500 Albumin.      Intake/Output Summary (Last 24 hours) at 08/09/18 1805  Last data filed at 08/09/18 1720   Gross per 24 hour   Intake          9976.79 ml   Output             6190 ml   Net          3786.79 ml       ECHO:  No results found for this or any previous visit.No results found for this or any previous visit.    CXR:  Recent Results (from the past 24 hour(s))   XR Chest Port 1 View    Narrative    Exam:  XR CHEST PORT 1 VW, 8/9/2018 12:28 AM    History: Check endotracheal tube placement and ECLS cannula placement.  DO NOT log-roll patient.  Place film under patient using 6 MAN LIFT;     Comparison:  None available.    Findings:  Single AP view of the chest. Endotracheal tube tip projects  approximately 4.6 cm above the josie. Right IJ [place] catheter tip  projects over the main pulmonary artery. Inferior approach ECMO  cannula tip projects over the mid right atrium. Mediastinal drains and  left chest tube is noted. Cardiac silhouette is within normal limits.  Low lung volumes. Small left pleural effusion and  adjacent basilar  opacities. No definite pneumothorax. Mild pulmonary vascular  congestion. Visualized upper abdomen is unremarkable.      Impression    Impression:    1. Endotracheal tube tip projects over the midthoracic trachea. ECMO  cannula tip projects over the mid right atrium. Additional support  devices as above.  2. Small left pleural effusion with adjacent basilar atelectasis  and/or consolidation.  3. Low lung volumes with mild pulmonary vascular congestion.    I have personally reviewed the examination and initial interpretation  and I agree with the findings.    MANUEL FELDMAN MD   XR Chest Port 1 View    Narrative    Exam:  Chest X-ray 8/9/2018 7:59 AM    History: Check endotracheal tube placement and ECLS cannula placement.  DO NOT log-roll patient.  Place film under patient using 6 MAN LIFT;     Comparison: 8/9/2018    Findings: Portable chest radiograph. Interval placement of right chest  tube. Left chest tube has advanced, tip now projecting near the left  lung base. Mediastinal drains. ECMO catheter with tip terminating at  the mid left atrium. Endotracheal tube in stable position. [place]  catheter with tip in the main pulmonary artery. Enteric tube  projecting over the esophagus, tip not visualized. Esophageal  temperature probe. Cardiac size is stable. Congested pulmonary  vasculature. Left pleural effusion with overlying opacities. No  definite pneumothorax.       Impression    Impression:  1. Interval placement of right chest tube and advancement of the left  chest tube.  2. Inferior approach ECMO catheter tip in the mid right atrium.  3. Left pleural effusion.  4. Hilar fullness with diffuse linear airspace opacities, likely  pulmonary edema.  5. Endotracheal tube in stable position    I have personally reviewed the examination and initial interpretation  and I agree with the findings.    AC PALACIOS MD   XR Chest Port 1 View    Narrative    Exam:  Chest X-ray 8/9/2018 1:56  PM    History: 78M s/p CAB x4 c/b Vfib arrest s/p washout x 2 and VA ECMO  cannulation. Assess positioning of PA catheter;     Comparison: Same day chest radiograph    Findings: Single portable chest radiograph. Endotracheal tube  projecting over the mid thoracic trachea. Right [place] catheter  with tip terminating in the proximal main pulmonary trunk, minimally  advanced compared to prior. Bilateral chest tubes in stable position.  Dual mediastinal drains. Inferior projectile catheter terminating in  the low right atrium. Enteric tube in stable position. Cardiac  silhouette size is stable. Congestive pulmonary vasculature. Fluffy  airspace opacity overlying the left apex. Left pleural effusion. No  pneumothorax.      Impression    Impression:  1. Right [place] catheter appears to be slightly advanced, still  within the proximal main pulmonary artery.  2. New left apical opacity, may represent developing loculated  hematoma. Recommend follow-up radiograph.  3. Left pleural effusion with bilateral perihilar fullness, suggestive  of pulmonary edema.  4. Additional support devices in stable position.     [Result: Left apical opacity]    Finding was identified on 8/9/2018 3:39 PM.     Dr. Herrera was contacted by Dr. Adorno at 8/9/2018 3:39 PM and  verbalized understanding of the urgent finding.     I have personally reviewed the examination and initial interpretation  and I agree with the findings.    AC PALACIOS MD   XR Fluoro Port Time 0/1 Hour    Narrative    This exam was marked as non-reportable because it will not be read by a   radiologist or a [place] non-radiologist provider.             XR Chest Port 1 View    Narrative    XR CHEST PORT 1 VW  8/9/2018 3:19 PM      HISTORY: SWAN Placement;     COMPARISON: Radiograph earlier same day    FINDINGS: Right IJ [place] catheter is slightly advanced anteriorly  positioned, tip projecting over the central right pulmonary artery.  Postsurgical changes in the  chest. ET tube in proper position.  Esophageal temperature probe is in the mid esophagus. Bilateral chest  tubes are noted. ECMO cannula projecting over the right atrium. Stable  cardiac silhouette. Bilateral perihilar airspace opacities, slightly  increased. There is increased rounded opacities projecting over the  left upper chest in the superior aspect of the left pleural space.      Impression    IMPRESSION:   1. Rapidly accumulating hematoma in the superior aspect of the left  pleural space.  2. Interval repositioning of right [place] catheter, tip projecting  over the right pulmonary artery.  3. Slightly increased bilateral perihilar opacities, likely pulmonary  edema and/or atelectasis.    [Result: Rapidly accumulating hematoma in the left apical pleural  space.]    Finding was identified on 8/9/2018 3:20 PM.     Dr. Fletcher was contacted by Dr. Walters at 8/9/2018 4:22 PM and  verbalized understanding of the urgent finding.     I have personally reviewed the examination and initial interpretation  and I agree with the findings.    AC PALACIOS MD   US Lower Extremity Arterial Duplex Bilateral Port    Narrative    Exam: Duplex ultrasound of bilateral lower extremity arteries dated  8/9/2018 3:31 PM     Clinical information: Baseline to assess blood flow to Lower  Extremities (VA ECMO);      Comparison: None available    Technique: Includes Gray Scale images, color Doppler, spectral Doppler  waveforms and velocities with appropriate angles of 60 degrees or  less.    Ordering provider: Irvin Coppola    Findings:     Right lower extremity:     Abnormal lower extremity waveforms, monophasic arterial prominence,  likely secondary to ECMO.    Common femoral artery: 35 cm/sec.   Deep femoral artery: 36 cm/sec.   Proximal SFA: 16 cm/sec.   Mid SFA: 29 cm/sec.   Distal SFA: 18 cm/sec.   Distal SFA into collateral: 62 cm/sec  Distal SFA, distal to collateral: No flow, likely chronically occluded  Popliteal artery  proximal/mid: No flow likely chronically occluded  Popliteal artery distal: 4 cm/sec.   PTA ankle: 19 cm/sec.   EUNICE ankle: No flow.    Left lower extremity:    Bandage obscuring the common femoral artery and deep femoral artery.  Proximal SFA: 50 cm/sec.   Mid SFA: 37 cm/sec.   Distal SFA: 38 cm/sec.   Popliteal artery: 15 cm/sec.   PTA ankle: 29 cm/sec.   EUNICE ankle: 20 cm/sec.       Impression    Impression:     1. 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 There is a absence of flow in the  PTA..  There are abnormal waveforms likely secondary to ECMO.  2. Left leg: Maintained vascular flow lower extremity vasculature  including the 18th PTA..  There are abnormal waveforms, likely  secondary to    Guidelines:  [facility] duplex criteria for lower limb arterial  occlusive disease  -Percent stenosis- Normal (1-19%): Peak systolic velocity (cm/s):  <150, End-diastolic velocity (cm/s): <40, Velocity ration (Vr): <1.5,  Distal arterial waveform: Triphasic  -Percent stenosis- 20-49%: Peak systolic velocity (cm/s): 150-200,  End-diastolic velocity (cm/s): <40, Velocity ration (Vr): 1.5-2.0,  Distal arterial waveform: Triphasic  -Percent stenosis- 50-75%: Peak systolic velocity (cm/s): 200-300,  End-diastolic velocity (cm/s): <90, Velocity ration (Vr): 2.0-3.9,  Distal arterial waveform: Poststenotic turbulence distal to stenosis,  monophasic distal waveform  -Percent stenosis- >75%: Peak systolic velocity (cm/s): >300,  End-diastolic velocity (cm/s): <90, Velocity ration (Vr): >4.0, Distal  arterial waveform: Dampened distal waveform and low PSV/EDV* in the  stenosis  -Percent stenosis- Occlusion: Absent flow by color Doppler/pulsed  Doppler spectral analysis; length of occlusion estimated from distance  between exit and reentry collateral arteries  *PSV = peak systolic  velocity, EDV = end-diastolic velocity  http://link.hyde.com/chapter/10.1007/585-8-6586-4005-4_23/fulltext  html       Labs:    Recent Labs  Lab 08/09/18  1553 08/09/18  1508 08/09/18  1421 08/09/18  1352 08/09/18  1349 08/09/18  1253   PH 7.49*  --  7.44  --  7.45 7.43   PCO2 40  --  47*  --  45 49*   PO2 218*  --  109*  --  52* 145*   HCO3 30*  --  32*  --  31* 32*   O2PER 50.0  50.0 50.0 50 50 50 50       Lab Results   Component Value Date    HGB 8.1 (L) 08/09/2018    PHGB <30 08/09/2018     (L) 08/09/2018    FIBR 182 (L) 08/09/2018    INR 1.72 (H) 08/09/2018    PTT 88 (H) 08/09/2018    DD 13.0 (H) 08/09/2018    AXA <0.10 08/09/2018    ANTCH 34 (L) 08/09/2018         Plan is to remain stable on VA ECMO, start CRRT, and chest washout this evening.      Miley Roberson, RRT  8/9/2018 6:05 PM

## 2018-08-09 NOTE — MR AVS SNAPSHOT
After Visit Summary   8/9/2018    Jay Moses    MRN: [id number]           Patient Information     Date Of Birth          1940        Visit Information        Provider Department      8/9/2018 3:00 PM [facility] EEG TECH 4 [facility] EEG        Today's Diagnoses     Cardiogenic shock (H)    -  1       Follow-ups after your visit        Your next 10 appointments already scheduled     Aug 10, 2018  7:00 AM CDT   24 Hour Video Visit with [facility] EEG TECH 4   [facility] ([facility])    [facility]  [address] [facility] [address]   [phone]           [place]: Your appointment is scheduled at [facility]. [address] [place] [address]            Aug 10, 2018   Procedure with Crhis Espinal MD   [facility], [place], Same Day Surgery (--)    [address] [facility] [id number] [phone]            Aug 17, 2018   Procedure with Chris Espinal MD   [facility], [place], Same Day Surgery (--)    [address] [id number] [phone]              Future tests that were ordered for you today     Open Standing Orders        Priority Remaining Interval Expires Ordered    ECLS settings STAT 84882/77533 CONTINUOUS  8/9/2018    Hemoglobin and hematocrit STAT 1/1 CONDITIONAL X 1 STAT  8/9/2018    Phosphorus Routine 97/100 CONDITIONAL (SPECIFY)  8/9/2018    Calcium ionized whole blood Timed 94/99 EVERY 4 HOURS  8/9/2018    CBC with platelets Timed 94/99 EVERY 4 HOURS  8/9/2018    Comprehensive metabolic panel Timed 94/99 EVERY 4 HOURS  8/9/2018    INR Timed 94/99 EVERY 4 HOURS  8/9/2018    Lactic acid whole blood Timed 94/99 EVERY 4 HOURS  8/9/2018    Partial thromboplastin time Timed 94/99 EVERY 4 HOURS  8/9/2018    Troponin I Timed 94/99 EVERY 4 HOURS  8/9/2018    Potassium Routine 100/100 CONDITIONAL (SPECIFY)  8/9/2018    Magnesium Routine 99/100 CONDITIONAL (SPECIFY)  8/9/2018    Blood culture Routine 99/100  DAILY  8/9/2018    Glucose monitor nursing POCT Routine [id number] PRN  8/8/2018    Glucose - Diabetes STAT 1/1 CONDITIONAL X 1 STAT  8/8/2018    Glucose monitor nursing POCT Routine [id number] PRN  8/8/2018    EKG 12-lead, tracing only Routine 6/7 DAILY  8/8/2018    Magnesium Routine 100/100 CONDITIONAL (SPECIFY)  8/8/2018    Phosphorus Routine 100/100 CONDITIONAL (SPECIFY)  8/8/2018    Erythrocyte sedimentation rate auto Routine 99/100 DAILY  8/8/2018    Hemoglobin plasma Routine 99/100 DAILY  8/8/2018    Heparin 10a Level Routine 99/100 DAILY  8/8/2018    Lactate Dehydrogenase Routine 99/100 DAILY  8/8/2018    Magnesium Routine 99/100 DAILY  8/8/2018    Phosphorus Routine 99/100 DAILY  8/8/2018    Sputum Culture Aerobic Bacterial Routine 99/100 DAILY  8/8/2018    Glucose whole blood Timed 96/100 EVERY 6 HOURS  8/8/2018    Heparin 10a Level Timed 96/100 EVERY 6 HOURS  8/8/2018    Magnesium Timed 96/100 EVERY 6 HOURS  8/8/2018    D dimer quantitative Timed 18/20 EVERY 12 HOURS  8/8/2018    Fibrinogen activity Timed 18/20 EVERY 12 HOURS  8/8/2018    Red blood cell prepare order unit conditional STAT 84/100 CONDITIONAL (SPECIFY) BLOOD  8/8/2018    Platelets prepare order unit conditional STAT 95/100 CONDITIONAL (SPECIFY) BLOOD  8/8/2018    Transfuse platelets unit conditional STAT 95/100 TRANSFUSE 1 DOSE  8/8/2018    Antithrombin III Routine 99/100 DAILY  8/8/2018    Calcium ionized whole blood Routine 99/100 DAILY  8/8/2018    CRP inflammation Routine 99/100 DAILY  8/8/2018    XR Chest Port 1 View Routine 5/7 DAILY IMAGING  8/8/2018    US Lower Extremity Arterial Duplex Bilateral Routine 100/100 CONDITIONAL (SPECIFY)  8/8/2018    Transfuse red blood cell unit conditional Routine 84/100 TRANSFUSE 1 UNIT  8/8/2018    Plasma prepare order unit conditional Routine 92/100 CONDITIONAL (SPECIFY) BLOOD  8/8/2018    Transfuse plasma unit conditional Routine 92/100 TRANSFUSE 1 UNIT  8/8/2018    Blood gas arterial and oxyhgb  Timed 179/192 EVERY 2 HOURS  2018    Blood gas ELS venous Timed 18/20 EVERY 12 HOURS  2018    Blood gas ELS arterial Timed 18/20 EVERY 12 HOURS  2018            Who to contact     Please call your clinic at [phone] to:    Ask questions about your health    Make or cancel appointments    Discuss your medicines    Learn about your test results    Speak to your doctor            Additional Information About Your Visit        MyChart Information     [employer] is an electronic gateway that provides easy, online access to your medical records. With [employer], you can request a clinic appointment, read your test results, renew a prescription or communicate with your care team.     To sign up for [employer] visit the website at [email or web address]   You will be asked to enter the access code listed below, as well as some personal information. Please follow the directions to create your username and password.     Your access code is: [email or web address]  Expires: 2018  6:45 PM     Your access code will  in 90 days. If you need help or a new code, please contact your [facility] or call [phone] for assistance.        Care EveryWhere ID     This is your Care EveryWhere ID. This could be used by other organizations to access your [facility] medical records  [id number]         Blood Pressure from Last 3 Encounters:   No data found for BP    Weight from Last 3 Encounters:   18 91.8 kg (202 lb 6.1 oz)              We Performed the Following     Glucose by meter          Today's Medication Changes      Notice     This visit is during an admission. Changes to the med list made in this visit will be reflected in the After Visit Summary of the admission.             Primary Care Provider    None Specified       No primary provider on file.        Equal Access to Services     AMARI ZIMMERMAN : cheryl Hurtado qaybta kaalmada adeegyada, waxay  addy meiermadan lawtonaan ah. So [facility] [phone].    ATENCIÓN: Si habla español, tiene a diamond disposición servicios gratuitos de asistencia lingüística. Llame al [phone].    We comply with applicable federal civil rights laws and [place] laws. We do not discriminate on the basis of race, color, national origin, age, disability, sex, sexual orientation, or gender identity.            Thank you!     Thank you for choosing [facility]  for your care. Our goal is always to provide you with excellent care. Hearing back from our patients is one way we can continue to improve our services. Please take a few minutes to complete the written survey that you may receive in the mail after your visit with us. Thank you!             Your Updated Medication List - Protect others around you: Learn how to safely use, store and throw away your medicines at www.disposemymeds.org.      Notice     This visit is during an admission. Changes to the med list made in this visit will be reflected in the After Visit Summary of the admission.

## 2018-08-09 NOTE — PROGRESS NOTES
CV ICU PROGRESS NOTE  August 9, 2018      CO-MORBIDITIES:   Multi-vessel CAD  S/p VA ECMO  VF Arrest  Respiratory Failure post op    ASSESSMENT: Jay Moses is a 78 year old male s/p CABGx4 at OSH on 8/8 complicated by VF Arrest post operatively, with repeat cath showing 50% lesion distal to LIMA anastamosis with placement of PCI to distal LIMA. Patient was placed on VA ECMO. Transferred to [facility] on ECMO and 4 pressors. After arrival, was defibrillatedx1 for VF arrest. Overnight after arrival was also noted to have bleeding and bedside washout was done with new chest tubes placed with large blood product requirements.     TODAY'S PROGRESS:   -s/p bedside washout, CTx4 placed overnight with significant drainage   -No bleeding from anastomotic sites   -Temporary closure  -Monitor coagulopathy, transfuse as needed  -Wean pressors as able  -Switch to amiodarone gtt PM 8/9 (from lidocaine gtt)  -Discuss with CVTS regarding anticoagulation plans - DAPT?  -Tentative plans for formal washout in OR (8/10)  -Plan TF tomorrow after OR  -ACT Goals 160-180  - Start cangrelor gtt    PLAN:  Neuro/ pain/ sedation:  -Post operative sedation  - Monitor neurological status. Notify the MD for any acute changes in exam.  - Deep sedation goals  - Fentanyl for pain.  - Versed for sedation.  - Cisatracurium for paralysis  - Per reports, was moving and reaching out post arrest at OSH    Pulmonary care:  #Post operative respiratory failure   - Intubated/Sedated/Paralyzed  - No indications to wean at this time, critically ill, lung protective settings  - ABG's    Cardiovascular:  #s/p CABGx4 (LIMA-LAD, SVG to PDA, OM and Diagonal)  #VF Arrest on 8/8 post CABG  #s/p PCI to distal LAD after LIMA anastamosis  #VA ECMO post CABG  - Monitor hemodynamic status.   - lidocaine gtt infusion throughout day, switch to amiodarone PM 8/9  - Wean pressors as able  - ECHO if able today or NADIA during surgery tomorrow evaluate cardiac function    GI  care:   - NPO  - OG in place    Fluids/ Electrolytes/ Nutrition:   - Boluses for IV fluid hydration  - No indication for parenteral nutrition.    Renal/ Fluid Balance:    - Urine output has been trending down  - Will continue to monitor intake and output.  - NG placement post op 8/10    Endocrine:  #Stress hyperglycemia    - Insulin gtt  - Sliding scale for diabetes management.    ID/ Antibiotics:  #Open Chest  - Open chest. Vanc/Zosyn  - Monitor fever curve, WBC    Heme:  #Anticoagulation post CABG  #Blood loss anemia     - PRBC for Hgb>8  - Received IV integrellin in cath lab  - Large volume transfusion AM 8/9 - 10 PRBC, 5 FFP, 5 Plt  - Stabilizing and transfusion goals for VA ECMO    Prophylaxis:    - Mechanical prophylaxis for DVT.   - VA ECMO. Heparin gtt for ACT goals 150-170  - Cangrelor gtt    Lines/ tubes/ drains:  - Right femoral VA ECMO cannulas, ETT, PIV, Tee, CTx4    Disposition:  -  CV ICU.    Patient seen, findings and plan discussed with CV ICU staff, Dr. Fletcher.    Jacob Bailey MD  8/9/2018 8:56 AM  Anesthesia Resident  PGY4/CA-3    ====================================    SUBJECTIVE:   Arrived overnight on four pressors to [facility]. Bedside washout with placement of new chest tubes, large volume of blood from washout requiring numerous units of blood products. PRBC 10, FFP 5, Plt 5    OBJECTIVE:   1. VITAL SIGNS:   Temp:  [96.1  F (35.6  C)-98.1  F (36.7  C)] 96.6  F (35.9  C)  Heart Rate:  [] 80  Resp:  [16-18] 16  MAP:  [56 mmHg-100 mmHg] 88 mmHg  Arterial Line BP: ()/(51-91) 132/76  FiO2 (%):  [80 %-100 %] 80 %  SpO2:  [92 %-100 %] 100 %  Ventilation Mode: CMV/AC  (Continuous Mandatory Ventilation/ Assist Control)  FiO2 (%): 80 %  Rate Set (breaths/minute): 16 breaths/min  Tidal Volume Set (mL): 450 mL  PEEP (cm H2O): 7 cmH2O  Oxygen Concentration (%): 80 %  Resp: 16    2. INTAKE/ OUTPUT:   I/O last 3 completed shifts:  In: 350 [I.V.:350]  Out: 1930 [Urine:1500; Chest Tube:430]    3.  PHYSICAL EXAMINATION:   General: Intubated/Sedated/Paralyzed   Neuro: Sedated  Resp: Mechanical ventilation, intubated, coarse breath sounds  CV: RRR, VA ECMO via left femoral vessels  Abdomen: Soft, dressings in place  Incisions: Dressing over chest, chest tubes in place, left femoral VA cannulas  Extremities: cool    4. INVESTIGATIONS:   Arterial Blood Gases     Recent Labs  Lab 08/09/18  0531 08/09/18  0419 08/09/18  0216 08/09/18  0024   PH 7.41 7.40 7.40 7.38   PCO2 41 38 34* 38   PO2 397* 374* 87 138*   HCO3 26 23 21 23     Complete Blood Count     Recent Labs  Lab 08/09/18  0232 08/08/18  2229   WBC 6.1 13.2*   HGB 6.4* 8.6*   PLT 98* 113*     Basic Metabolic Panel    Recent Labs  Lab 08/09/18  0232 08/09/18  0216 08/08/18  2300 08/08/18  2229   *  --   --  148*   POTASSIUM 2.4* Canceled, Test credited 2.6* 3.0*   CHLORIDE 116*  --   --  114*   CO2 22  --   --  17*   BUN 14  --   --  12   CR 0.92  --   --  0.80   * 344*  --  460*     Liver Function Tests    Recent Labs  Lab 08/09/18 0232 08/08/18  2229   AST 29 34   ALT 17 19   ALKPHOS 34* 52   BILITOTAL 1.5* 1.0   ALBUMIN 2.9* 2.7*   INR 2.10* 2.09*     Pancreatic Enzymes  No lab results found in last 7 days.  Coagulation Profile    Recent Labs  Lab 08/09/18 0232 08/08/18 2229   INR 2.10* 2.09*   PTT 47*  --          5. RADIOLOGY:   Recent Results (from the past 24 hour(s))   XR Chest Port 1 View    Narrative    1. Endotracheal tube tip projects over the midthoracic trachea. ECMO  cannula tip projects over the mid right atrium. Additional support  devices as above.  2. Small left pleural effusion with adjacent basilar atelectasis  and/or consolidation.  3. Low lung volumes with mild pulmonary vascular congestion.       =========================================

## 2018-08-09 NOTE — PROGRESS NOTES
CVTS PROGRESS NOTE  August 9, 2018      CO-MORBIDITIES:   Multi-vessel CAD  S/p VA ECMO  VF Arrest  Respiratory Failure post op    ASSESSMENT: Jay Moses is a 78 year old male s/p CABGx4 at OSH on 8/8 complicated by VF Arrest post operatively, with repeat cath showing 50% lesion distal to LIMA anastamosis with placement of PCI to distal LIMA. Patient was placed on VA ECMO. Transferred to [facility] on ECMO and 4 pressors. After arrival, was defibrillatedx1 for VF arrest. Overnight after arrival was also noted to have bleeding and bedside washout was done with new chest tubes placed with large blood product requirements.     TODAY'S PROGRESS:   -s/p bedside washout, CTx4 placed overnight with significant drainage   -No bleeding from anastomotic sites   -Temporary closure  -Monitor coagulopathy, transfuse as needed  -Wean pressors as able  -Switch to amiodarone gtt PM 8/9 (from lidocaine gtt)  -Discuss with CVTS regarding anticoagulation plans - DAPT?  -Tentative plans for formal washout in OR (8/10)  -Plan TF tomorrow after OR  -ACT Goals 160-180  - Start cangrelor gtt    PLAN:  Neuro/ pain/ sedation:  -Post operative sedation  - Monitor neurological status. Notify the MD for any acute changes in exam.  - Deep sedation goals  - Fentanyl for pain.  - Versed for sedation.  - Cisatracurium for paralysis  - Per reports, was moving and reaching out post arrest at OSH    Pulmonary care:  #Post operative respiratory failure   - Intubated/Sedated/Paralyzed  - No indications to wean at this time, critically ill, lung protective settings  - ABG's    Cardiovascular:  #s/p CABGx4 (LIMA-LAD, SVG to PDA, OM and Diagonal)  #VF Arrest on 8/8 post CABG  #s/p PCI to distal LAD after LIMA anastamosis  #VA ECMO post CABG  - Monitor hemodynamic status.   - lidocaine gtt infusion throughout day, switch to amiodarone PM 8/9  - Wean pressors as able  - ECHO if able today or NADIA during surgery tomorrow evaluate cardiac function    GI care:    - NPO  - OG in place    Fluids/ Electrolytes/ Nutrition:   - Boluses for IV fluid hydration  - No indication for parenteral nutrition.    Renal/ Fluid Balance:    - Urine output has been trending down  - Will continue to monitor intake and output.  - NG placement post op 8/10    Endocrine:  #Stress hyperglycemia    - Insulin gtt  - Sliding scale for diabetes management.    ID/ Antibiotics:  #Open Chest  - Open chest. Vanc/Zosyn  - Monitor fever curve, WBC    Heme:  #Anticoagulation post CABG  #Blood loss anemia     - PRBC for Hgb>8  - Received IV integrellin in cath lab  - Large volume transfusion AM 8/9 - 10 PRBC, 5 FFP, 5 Plt  - Stabilizing and transfusion goals for VA ECMO    Prophylaxis:    - Mechanical prophylaxis for DVT.   - VA ECMO. Heparin gtt for ACT goals 150-170  - Cangrelor gtt    Lines/ tubes/ drains:  - Right femoral VA ECMO cannulas, ETT, PIV, Tee, CTx4    Disposition:  -  CV ICU.    Patient seen, findings and plan discussed with CVTS Fellow.    Jacob Bailey MD  8/9/2018 8:56 AM  Anesthesia Resident  PGY4/CA-3    ====================================    SUBJECTIVE:   Arrived overnight on four pressors to [facility]. Bedside washout with placement of new chest tubes, large volume of blood from washout requiring numerous units of blood products. PRBC 10, FFP 5, Plt 5    OBJECTIVE:   1. VITAL SIGNS:   Temp:  [94.8  F (34.9  C)-98.1  F (36.7  C)] 95  F (35  C)  Heart Rate:  [] 79  Resp:  [16-18] 16  MAP:  [56 mmHg-103 mmHg] 83 mmHg  Arterial Line BP: ()/(51-91) 132/67  FiO2 (%):  [50 %-100 %] 50 %  SpO2:  [92 %-100 %] 100 %  Ventilation Mode: CMV/AC  (Continuous Mandatory Ventilation/ Assist Control)  FiO2 (%): 50 %  Rate Set (breaths/minute): 16 breaths/min  Tidal Volume Set (mL): 450 mL  PEEP (cm H2O): 7 cmH2O  Oxygen Concentration (%): 50 %  Resp: 16    2. INTAKE/ OUTPUT:   I/O last 3 completed shifts:  In: 4605.06 [I.V.:1604.06]  Out: 4430 [Urine:2680; Chest Tube:1750]    3. PHYSICAL  EXAMINATION:   General: Intubated/Sedated/Paralyzed   Neuro: Sedated  Resp: Mechanical ventilation, intubated, coarse breath sounds  CV: RRR, VA ECMO via left femoral vessels  Abdomen: Soft, dressings in place  Incisions: Dressing over chest, chest tubes in place, left femoral VA cannulas  Extremities: cool    4. INVESTIGATIONS:   Arterial Blood Gases     Recent Labs  Lab 08/09/18  1349 08/09/18  1253 08/09/18  1210 08/09/18  1003   PH 7.45 7.43 7.40  Canceled, Test credited 7.44   PCO2 45 49* 49*  Canceled, Test credited 44   PO2 52* 145* 145*  Canceled, Test credited 389*   HCO3 31* 32* 32*  Canceled, Test credited 30*     Complete Blood Count     Recent Labs  Lab 08/09/18  1210 08/09/18  1003 08/09/18  0645 08/09/18  0232   WBC 6.5 6.8 8.5 6.1   HGB 7.5* 8.6* 9.1* 6.4*   * 58* 69* 98*     Basic Metabolic Panel    Recent Labs  Lab 08/09/18  1210 08/09/18  1003 08/09/18  0645 08/09/18  0232   * 157* 156* 156*   POTASSIUM 4.2 3.4 2.6* 2.4*   CHLORIDE 120* 120* 119* 116*   CO2 31 30 27 22   BUN 17 16 14 14   CR 1.41* 1.23 0.90 0.92   GLC 86 202*  213* 327* 341*     Liver Function Tests    Recent Labs  Lab 08/09/18  1210 08/09/18  1003 08/09/18  0645 08/09/18  0232 08/08/18  2229   AST 25 21  --  29 34   ALT 14 16  --  17 19   ALKPHOS 29* 29*  --  34* 52   BILITOTAL 1.8* 1.8*  --  1.5* 1.0   ALBUMIN 2.7* 2.7*  --  2.9* 2.7*   INR 1.74* 1.79* 1.71* 2.10* 2.09*     Pancreatic Enzymes  No lab results found in last 7 days.  Coagulation Profile    Recent Labs  Lab 08/09/18  1210 08/09/18  1003 08/09/18  0645 08/09/18  0232   INR 1.74* 1.79* 1.71* 2.10*   PTT 54* 54*  --  47*         5. RADIOLOGY:   Recent Results (from the past 24 hour(s))   XR Chest Port 1 View    Narrative    Exam:  XR CHEST PORT 1 VW, 8/9/2018 12:28 AM    History: Check endotracheal tube placement and ECLS cannula placement.  DO NOT log-roll patient.  Place film under patient using 6 MAN LIFT;     Comparison:  None  available.    Findings:  Single AP view of the chest. Endotracheal tube tip projects  approximately 4.6 cm above the josie. Right IJ [place] catheter tip  projects over the main pulmonary artery. Inferior approach ECMO  cannula tip projects over the mid right atrium. Mediastinal drains and  left chest tube is noted. Cardiac silhouette is within normal limits.  Low lung volumes. Small left pleural effusion and adjacent basilar  opacities. No definite pneumothorax. Mild pulmonary vascular  congestion. Visualized upper abdomen is unremarkable.      Impression    Impression:    1. Endotracheal tube tip projects over the midthoracic trachea. ECMO  cannula tip projects over the mid right atrium. Additional support  devices as above.  2. Small left pleural effusion with adjacent basilar atelectasis  and/or consolidation.  3. Low lung volumes with mild pulmonary vascular congestion.    I have personally reviewed the examination and initial interpretation  and I agree with the findings.    MANUEL FELDMAN MD   XR Chest Port 1 View    Narrative    Exam:  Chest X-ray 8/9/2018 7:59 AM    History: Check endotracheal tube placement and ECLS cannula placement.  DO NOT log-roll patient.  Place film under patient using 6 MAN LIFT;     Comparison: 8/9/2018    Findings: Portable chest radiograph. Interval placement of right chest  tube. Left chest tube has advanced, tip now projecting near the left  lung base. Mediastinal drains. ECMO catheter with tip terminating at  the mid left atrium. Endotracheal tube in stable position. [place]  catheter with tip in the main pulmonary artery. Enteric tube  projecting over the esophagus, tip not visualized. Esophageal  temperature probe. Cardiac size is stable. Congested pulmonary  vasculature. Left pleural effusion with overlying opacities. No  definite pneumothorax.       Impression    Impression:  1. Interval placement of right chest tube and advancement of the left  chest tube.  2.  Inferior approach ECMO catheter tip in the mid right atrium.  3. Left pleural effusion.  4. Hilar fullness with diffuse linear airspace opacities, likely  pulmonary edema.  5. Endotracheal tube in stable position    I have personally reviewed the examination and initial interpretation  and I agree with the findings.    AC PALACIOS MD       =========================================

## 2018-08-09 NOTE — PROGRESS NOTES
[facility] Cannulation Note:    Date on: 8/9/2018  Time on: 1820  Surgeon: Teddy    Arterial Cannula: 25 Fr. In the Left Femoral Artery      Venous Cannula: 25 Fr. In the Right Femoral Vein      ECMO components include CentriMag, Quadrox Oxygenator/circuit- Lot # [id number]    Cannulation was performed at the patient's bedside at [facility]3], placement was verified by CXR, cannulas are in good position.  ISTAT and blood cooler are at bedside. Patient's blood type is O, negative.    Isac Jiang, RRT  8/8/2018 11:00 PM

## 2018-08-09 NOTE — PROCEDURES
Pulmonary Artery Catheter Reposition    Mr. Moses's pulmonary artery catheter was noted to demonstrate no pulsatility when pressures were measured in the distal pulmonary artery port.  It was also difficult to flush and draw blood from the distal port.  CXR was performed showing a position consistent with coronary sinus cannulation.  Portable fluoroscopy was brought to the bedside where the cannula was withdrawn to the RA and readvanced to the pulmonary artery where pulsatility was immediately present.  ABG and complete hemodynamic assessment will be performed.    Octavio Fletcher MD, PhD  Interventional/Critical Care Cardiology  [phone]    August 9, 2018

## 2018-08-09 NOTE — PLAN OF CARE
Problem: Patient Care Overview  Goal: Plan of Care/Patient Progress Review  Outcome: Declining  Patient transferred from OSH last evening at 2200 on VA ECMO s/p VF cardiac arrest post CABG.  Afebrile, HR 60s-110s, SR, junctional, and ST. 1 episode of Vfib upon transfer and defibrillated.  BPs very labile MAPs 40s-100s.  Total of 8 amps Bicarb given, 2 g CaCl, 7 units PRBCs, 2 units platelets, 4 units FFP, 1 L albumin and 2 sticks Epi given over night.  Patient bleeding large amounts out of mediastinal chest tubes and open chest dressings bulging out.  Dr. Espinal came to bedside at 0545 to wash out chest and redress dressing.  EBL 1.5 L.  K very low, total of 120 mEq given over night. BGs 300s, on insulin drip at 15 units/hr. Adequate UOP. Family updated on progress by Dr. Espinal. Will continue current plan of care and update MDs with any changes.

## 2018-08-09 NOTE — PLAN OF CARE
Problem: Patient Care Overview  Goal: Plan of Care/Patient Progress Review  Outcome: No Change  D: Post CABx4; Vfib Arrest; Currently open chest/VA ECMO  I/A: Neuro: Patient sedated/paralyzed with versed/fentanyl/cis. Pupils equal/reactive. EEG monitor in place.   Resp: Tolerating vent settings. See ABGs  Card: 100% AV Paced. Vaso/Epi/Levo titrated to maintain MAP >65. VA ECMO currently at 3800 RPM with flows around ~4LPM. 500 ml albumin given for chugging and decreased MAP.  Heme: 2 Units PRBCs given; 2 Units of Platlets; 1 Unit of FFP.  GI: BS not audible. Nutrition to be started after OR tomorrow.  : Tee with UOP tapering off to ~5cc/hr. Creatinine rising. Provider aware.  Skin: Mepilex in place. Turn Q2. Patient does not tolerate lying on right side.  [place] malpositioned. No more VBGs per Dr. Fletcher.  CTs with ~100cc/hr out.   Hold Cangrelor per Dr Fletcher.   All labs updated to primary team.    P: Continue to monitor. Plan for washout tomorrow in OR

## 2018-08-09 NOTE — CONSULTS
Cardiology Consult Note      Patient Name: Jay Moses MRN# [id number]   Age: 78 year old YOB: 1940     Date of Admission:8/8/2018             Assessment and Plan:     # VF arrest on 8/8 from R on T phenomena related torsades vs ischemia s/p PCI to distal LAD after LIMA anastamosis  # Cardiogenic shock on VA ECMO placed 8/8- good native pulsatility so no LV unloading intervention done.  # s/p 4vCABG (LIMA-LAD, SVG to PDA, OM and Diag) on 8/8  # s/p PCI to distal LAD after LIMA anastamosis on 8/8 (pt got IV integrellin)  # One episode of polymorphic VT/VF here resolved with one shock- unclear etiology- probably from hemodynamic from heart failure. Other possibilities include ischemia vs prolonged QT- started on lidocaine    # Open chest  # Bleeding in chest tube-chest washout on 8/9  # Anemia from blood loss from chest  # Intubated for airway protection    Plan  - Continue ECMO support for now. Wean pressors as able  - Consider starting Plavix (pt go IV integrelin during the time of PCI to distal LAD on 8/8) for PCI to distal LAD  - Continue lidocaine for now. Can be stopped after resolution of ventricular arrhythmias in the next 24 hours.    Patient will be staffed in the morning.    Denis Guallpa  General Cardiology Fellow, PGY6  Pager [phone]           Chief Complaint:   Cardiogenic shock on VA ECMO         HPI:   78 year old man had 4vCABG (LIMA-LAD, SVG to PDA, OM and Diag) on 8/8. He had VF arrest in the afternoon (coded for 45 minutes) which is thought to be from R on T pacing related torsades or ischemia. He underwent repeat cath after the arrest which showed 50% lesion distal to LIMA anastomosis which was stented. He got IV integrellin in cath lab. He was placed on VA ECMO via right femoral vessels. He had good pulsatility so no LV unloading intervention done.  Upon arrival, he was on epi, norepi, vasopressin. Dopamine was stopped. MAP 60s HR 100s. He went into VF and got defibrillated  "with 300J once within a minute with resolution of VF. Based on review of rhythm strip, it appeared to be polymorphic VT/VF. Pt was in sinus tachy with HR 100s with occasional PVCs before going into VF. He got Magnesium and was started on Lidocaine. For K 2.9, he got potassium and bicarbonate amps for HCO3 of 16.  He had ECMO chugging for which abumin and blood products given. ECMO venous canula also readjusted by Dr. Espinal at bedside. Initially he had high bloody output in chest tube which then later decreased.  Per report, after his arrest at OSH, he was awake and grabbing at personnel.  Not able to see anything on bedside echo.         Past Medical History:   No past medical history on file.           Past Surgical History:   No past surgical history on file.           Social History:     Social History     Social History     Marital status: N/A     Spouse name: N/A     Number of children: N/A     Years of education: N/A     Occupational History     Not on file.     Social History Main Topics     Smoking status: Not on file     Smokeless tobacco: Not on file     Alcohol use Not on file     Drug use: Not on file     Sexual activity: Not on file     Other Topics Concern     Not on file     Social History Narrative            Family History:   No family history on file.             Allergies:    Allergies not on file         Medications:     No prescriptions prior to admission.             Review of Systems:   A 14 point ROS was completed and is negative other than what is stated in the HPI.          Physical Exam:   Height 1.803 m (5' 11\"), weight 91.8 kg (202 lb 6.1 oz).  Gen: intubated  Head: atraumatic   Eyes: EOM intact   Mouth: ETT  Lymph node: not enlarged on head or neck   CV: RRR, S1 & S2, no murmurs/gallops   Lungs: CTAB   Abd: soft, nontender, BS present   Pysch: alert and oriented      Tubes/Lines/Devices:             Data:   Laboratory data reviewed.     Cultures:   Invalid input(s): BC     No results " for input(s): CULT in the last 168 hours.    No results for input(s): URC in the last 168 hours.    Unresulted Labs Ordered in the Past 30 Days of this Admission     Date and Time Order Name Status Description    8/8/2018 2250 Plasma prepare order unit conditional In process     8/8/2018 2250 Platelets prepare order unit conditional In process     8/8/2018 2250 Platelets prepare order unit conditional In process     8/8/2018 2231 Hemoglobin A1c In process     8/8/2018 2229 Phosphorus In process     8/8/2018 2229 Magnesium In process     8/8/2018 2229 Type and Screen (AM Draw) In process     8/8/2018 2208 Troponin I In process     8/8/2018 2208 Lactate Dehydrogenase In process     8/8/2018 2208 Hemoglobin plasma In process     8/8/2018 2208 Drug Screen Comp Med Report WB In process     8/8/2018 2208 Comprehensive metabolic panel In process     8/8/2018 2208 CRP inflammation In process           Imaging/Studies reviewed.              I personally saw and examined this patient, reviewed imaging and laboratory studies, confirmed physical examination and discussed results and plan with patient and or family.

## 2018-08-09 NOTE — PROGRESS NOTES
SPIRITUAL HEALTH SERVICES  SPIRITUAL ASSESSMENT Progress Note (Palliative Focus)  [facility] ([place]) 4E    PRIMARY FOCUS:     Goals of care    Emotional/spiritual/Judaism distress    Support for coping    REFERRAL SOURCE: Palliative care spiritual assessment.    ILLNESS CIRCUMSTANCES:   Reviewed documentation. Reflective conversation shared with family of patient Jay Moses which integrated elements of illness and family narratives.     Context of Serious Illness/Symptom(s) - Jay Moses is a 78 year old male transferred from an outside hospital on VA ECMO. Family described their initial hope and subsequent fear with events of surgery and vfib arrest yesterday.    Resources for Support -   o Family: immediate and extended family  o Friends: from career as a  and from their community    DISTRESS:     Emotional/Spiritual/Existential Distress - Family sharing concern for Jay's condition and prognosis while also processing their shock at how swiftly his condition had changed.    Temple Distress - None named in visit    Social/Cultural/Economic Distress - None named in visit.    SPIRITUAL/Quaker COPING:     Zoroastrianism/Raisa - Denominational - personal spirituality.    Spiritual Practice(s) - Family requested prayer at bedside. Their spiritual practices as a family includes playing games together, eating together, and sharing jokes.    Emotional/Relational/Existential Connections - Family very supportive of Jay and one another. He loves being outdoors, baking apple pies, and caring for his cats.    GOALS OF CARE:    Goals of Care - Not discussed in visit.    Meaning/Sense-Making - not discussed in visit.    PLAN: I will follow for spiritual support.    Lucinda Bangura  Palliative   Pager [phone]  [facility] Inpatient Team Consult pager [phone] (M-F 8-4:30)  After-hours Answering Service [phone]

## 2018-08-09 NOTE — PROGRESS NOTES
ECMO Shift Summary:    Patient remains on VA ECMO, all equipment is functioning and alarms are appropriately set. RPM's 4000 with flow range 4.2-4.5 L/min. Sweep gas is at 5 LPM and FiO2 100%. Circuit remains free of air, clot and fibrin. Cannulas are secure with little bleeding from site, but significant chest output. Extremities are slightly warm to the touch. Suctioned ETT for small amount of secretions.    Significant Shift Events:    Vent settings:  Ventilation Mode: CMV/AC  (Continuous Mandatory Ventilation/ Assist Control)  FiO2 (%): 80 %  Rate Set (breaths/minute): 16 breaths/min  Tidal Volume Set (mL): 450 mL  PEEP (cm H2O): 7 cmH2O  Oxygen Concentration (%): 80 %  Resp: 16.    No heparin is running currently per MD, ACT range 135.    Urine output is adequate, significant blood loss from chest tubes. Products given included 6 units of PRBC, 4 units of FFP, 2 units of platelets, 1 liter of albumin.      Intake/Output Summary (Last 24 hours) at 08/09/18 0603  Last data filed at 08/09/18 0515   Gross per 24 hour   Intake          4424.19 ml   Output             4350 ml   Net            74.19 ml       ECHO:  No results found for this or any previous visit.No results found for this or any previous visit.    CXR:  Recent Results (from the past 24 hour(s))   XR Chest Port 1 View    Narrative    1. Endotracheal tube tip projects over the midthoracic trachea. ECMO  cannula tip projects over the mid right atrium. Additional support  devices as above.  2. Small left pleural effusion with adjacent basilar atelectasis  and/or consolidation.  3. Low lung volumes with mild pulmonary vascular congestion.       Labs:    Recent Labs  Lab 08/09/18  0531 08/09/18  0419 08/09/18  0216 08/09/18  0024   PH 7.41 7.40 7.40 7.38   PCO2 41 38 34* 38   PO2 397* 374* 87 138*   HCO3 26 23 21 23   O2PER 80.0 80.0 100.0 100       Lab Results   Component Value Date    HGB 6.4 (LL) 08/09/2018    PHGB <30 08/09/2018    PLT 98 (L) 08/09/2018     FIBR 131 (L) 08/09/2018    INR 2.10 (H) 08/09/2018    PTT 47 (H) 08/09/2018    DD 0.6 (H) 08/09/2018    AXA <0.10 08/09/2018         Plan is currently chest washout at bedside by Dr. Espinal.      Isac Jiang, RRT  8/9/2018 6:03 AM

## 2018-08-09 NOTE — PROGRESS NOTES
[facility], [facility] Nurse Inpatient Adult Pressure Injury Prevention Assessment: ECMO  Initial     Positioning Tolerance: Per RN only tolerating left and supine positioning currently  Date of ECMO cannulation: 8/8/18  Presence of Ischemia: No  Location of ischemia: NA    Pressure Injury Prevention Interventions In Place:  Optifoam Dressing under ECMO Cannula, Z flow Positioner under head, TAPs Wedge Positioners in use, Heel off-loading boots, Mepilex Sacral Dressing and Dressing to sacrum for prevention   Current support surface: Standard  Low air loss mattress       Pressure Injury Prevention Interventions Added:  None        Plan of Care for Positioning and Pressure Injury Prevention  Reposition patient every 1-2 hours using TAP Wedges  Position head on Z flow positioner, mold indentation at areas of pressure points.  Pad ECMO IJ cannula with Optifoam (#[id number]) along face and scalp between skin and cannula and under Coban head wraps    Pad ECMO groin and chest cannula under rigid connectors with Optifoam or Soft cloth  Heel off-loading Boots at all times  Sacral Mepilex for Prevention, change every 5 days and prn  Low Air loss mattress    Patient History:   According to medical record: Jay Moses is a 78 year old male s/p CABGx4 at OSH on 8/8 complicated by VF Arrest post operatively, with repeat cath showing 50% lesion distal to LIMA anastamosis with placement of PCI to distal LIMA. Patient was placed on VA ECMO. Transferred to [facility] on ECMO and 4 pressors. After arrival, was defibrillatedx1 for VF arrest. Overnight after arrival was also noted to have bleeding and bedside washout was done with new chest tubes placed with large blood product requirements.      Current Diet / Nutrition:       Active Diet Order      NPO for Medical/Clinical Reasons Except for: No Exceptions    Output:    I/O last 3 completed shifts:  In: 4605.06 [I.V.:1604.06]  Out: 4430 [Urine:2680; Chest  Tube:1750]  Containment: of urine/stool: Incontinence Protocol and Urinary Catheter    Risk Assessment:   Sensory Perception: 1-->completely limited  Moisture: 4-->rarely moist  Activity: 1-->bedfast  Mobility: 1-->completely immobile  Nutrition: 2-->probably inadequate  Friction and Shear: 2-->potential problem  José Score: 11    Labs:    Recent Labs  Lab 08/09/18  1210  08/09/18  0232 08/08/18  2230   ALBUMIN 2.7*  < > 2.9*  --    HGB 7.5*  < > 6.4*  --    INR 1.74*  < > 2.10*  --    WBC 6.5  < > 6.1  --    A1C  --   --   --  6.2*   CRP  --   --  14.0*  --    < > = values in this interval not displayed.    Focused Assessment: Left groin ECMO cannula    Pressure Injury Present::No      Discussed plan of care with Nurse  WOC Nurse follow-up plan:weekly    Bhargavi Meneses RN, CWOCN

## 2018-08-09 NOTE — PROGRESS NOTES
[facility] Cardiology Progress Note           Assessment and Plan:     78 year old male with s/p CABG complicated by post-operative cardiac arrest requiring ECMO placement.  Following ECMO placement underwent coronary angiogram and underwent stenting of the distal LAD after the LIMA.  Has had recurrent VF arrest and significant bleeding post-procedure.    Neuro  - Versed, fentanyl and cisatracurium  - Chest open so RAAS -4-5    Pulmonary  - Intubated, sedated, paralyzed  - No wean given critical illness at this time, open chest  - PPI ppx    Cardiovascular  - Can pull swan if it remains non-functional  - Q4-6h hemoglobin checks  - Recommend to start P2Y12 once stable from surgical standpoint.  Recommend DAPT given bare-metal stent placement as soon as possible  - VA ECMO, following PTTs but not on heparin currently due to bleeding  - ECMO labs and transfusions as needed  - Consider NADIA today or tomorrow (potential intraoperative during washout tomorrow)  - Vancomycin and Zosyn x5 days, further doses pending clinical course  - VF on lidocaine, consider transition over to amiodarone this afternoon if remains stable/improving and without further arrhythmias  - Wean pressors as able  - Chest washout, evaluation of closure tomorrow    GI/Nutrition  - Will consider tube feeds in next 24 hours pending clinical course, likely tomorrow after OR    ID  - Cultures pending  - Post-ECMO Vanc/Zosyn ordered    Renal  - Cr stable today, anticipate rise in coming days related to acute illness  - Replete lytes  - Stop bicarb drip    Octavio Wilks MD  Cardiology Fellow        Interval History:     VA ECMO placed, significant chest tube output with bedside washout this AM, ongoing pressor requirements although coming down slowly.          Medications:       artificial tears   Both Eyes Q8H     pantoprazole (PROTONIX) IV  40 mg Intravenous Daily     piperacillin-tazobactam  3.375 g Intravenous Q6H     sodium  chloride (PF)  3 mL Intracatheter Q8H     vancomycin place rollins - receiving intermittent dosing  1 each Does not apply See Admin Instructions             Physical Exam:   Temp:  [96.1  F (35.6  C)-98.1  F (36.7  C)] 96.8  F (36  C)  Heart Rate:  [] 79  Resp:  [16-18] 16  MAP:  [56 mmHg-100 mmHg] 94 mmHg  Arterial Line BP: ()/(51-91) 116/87  FiO2 (%):  [80 %-100 %] 80 %  SpO2:  [92 %-100 %] 100 %    Intake/Output Summary (Last 24 hours) at 08/09/18 0653  Last data filed at 08/09/18 0600   Gross per 24 hour   Intake          4604.06 ml   Output             4430 ml   Net           174.06 ml       Intubated  Sedated  Pupils minimally reactive  Difficult to hear heart sounds  Mechanical breath sounds bilaterally  Chest tubes with bloody output  Extremities are edematous, 1+, VA ECMO on left  Dressing over central chest, chest open            Data:   ROUTINE IP LABS (Last four results)  BMP  Recent Labs  Lab 08/09/18  0232 08/09/18  0216 08/08/18  2300 08/08/18  2229   *  --   --  148*   POTASSIUM 2.4* Canceled, Test credited 2.6* 3.0*   CHLORIDE 116*  --   --  114*   THADDEUS 8.8  --   --  7.8*   CO2 22  --   --  17*   BUN 14  --   --  12   CR 0.92  --   --  0.80   * 344*  --  460*     CBC  Recent Labs  Lab 08/09/18 0232 08/08/18 2229   WBC 6.1 13.2*   RBC 2.17* 2.92*   HGB 6.4* 8.6*   HCT 18.4* 25.8*   MCV 85 88   MCH 29.5 29.5   MCHC 34.8 33.3   RDW 15.2* 14.9   PLT 98* 113*     INR  Recent Labs  Lab 08/09/18 0232 08/08/18 2229   INR 2.10* 2.09*     Faculty Attestation  Stef Herrera M.D.    I personally saw and examined this patient, reviewed recent laboratories and imaging studies, discussed the case with the housestaff, and conveyed plan to the patient.  I answered all questions from patient and/or family. I agree with the examination, assessment and plan outlined here.

## 2018-08-09 NOTE — H&P
History and Physical     Jay Moses MRN# [id number]   YOB: 1940 Age: 78 year old      Date of Admission:  8/8/2018    Primary care provider: No primary care provider on file.          Assessment and Plan:   77 y/o M with ASCVD and HFrEF s/p CAB x 4 today at [facility]'s c/b Vfib arrest in ICU. Chest was opened bedside without evidence of tamponade, DCCV obtained sinus rhythm briefly and bedside peripheral VA ECMO was initiated via left femoral vessels. He proceeded to the cath lab where a BMS was placed in the LAD distal to the LIMA to LAD anastomosis. He was transferred to [facility] on VA ECMO for further management.     Neuro:  - RASS -4 to -5 with open chest, fentanyl gtt, midazolam gtt, if required will consider cisatricurium  CV:  - VA ECMO with open chest, transfuse to goal HG > 8, Plt > 50, no heparin   - No BB or statin at this time  - chest washout Friday   - Epi,Vaso, NE, Dopamine for cardiogenic shock, goal MAP 65, goal Flows 4.4  Pulm:  - intubated, ventilated, rest vent settings.   FEN/GI:  - NPO  Renal/:  - littlejohn for hourly I/O  Endo:  - insulin gtt per protocol  ID:  - vanco/zosyn for open chest  PPX:  - PPI  Lines/Drains:  - RIJ cordis/eron morton, peripheral VA ecmo cannulas    Wagner Almonte MD PGY6  Cardiothoracic Surgery Fellow  [phone]                  Chief Complaint:   VA ECMO s/p CAB x 4.              Past Medical History:            Past Surgical History:   No past surgical history on file.          Social History:     Social History   Substance Use Topics     Smoking status: Not on file     Smokeless tobacco: Not on file     Alcohol use Not on file             Family History:   No family history on file.          Immunizations:     There is no immunization history on file for this patient.         Allergies:   Allergies not on file          Medications:     No prescriptions prior to admission.             Review of Systems:   Review of systems not obtained  due to patient factors - critical condition      gen RASS -5  CV: sinus tachy 120s, VA ecmo, Flow 3.8, left groin cannulation, chest open med x 2, left pleural sanguinous  Pulm: intubated, ventilated  Abd: Soft, NT, ND  Ext, Cool, diminished cap refill         Data:   pending

## 2018-08-09 NOTE — OP NOTE
OPERATIVE DATE: 8/9/2018    PRE-OPERATIVE DIAGNOSIS:  1) Bleeding    POST-OPERATIVE DIAGNOSIS:  1) Coagulopathic bleeding    PROCEDURE:  1) Chest washout    SURGEON: Chris Espinal MD    ASSISTANT: Seun Fields MD    ANESTHESIA: GETA    ESTIMATED BLOOD LOSS: 1000cc    INDICATIONS:  Mr. KIRILL NGUYEN is a 78 year old male admitted after CABG complicated by postop VF arrest.  The patient had persistent chest tube output and saturated dressing.  I recommended to the family take chest washout.  Risks and benefits of the operation were explained to the patient and their family including, but not limited to, bleeding, infection, stroke and even death.  They understood these risks and agreed to proceed electively.    OPERATIVE REPORT:  The patient was kept supine in his ICU bed.  Sedation and pain medication was maintained by the ICU staff. Endotracheal intubation and IV access was already in place. The patients chest, and abdomen were prepped and draped in sterile fashion.  A pre-procedure time-out was performed confirming the correct patient, correct site and correct procedure.    The previous dressing was removed.  There was a large amount of blood and clot in the pericardium.  There was not active bleeding identified.      The patient was stable on moderate doses of inotropes and vasopressors.  Repeat labs were stable.  The patient had received multiple doses of blood products and large volume of fluid resuscitation.  I decided to pack the chest open.  The chest was packed with 1 kerlex.  An esmarch dressing was sutured in place.  An ioban dressing was applied.    The patient was then transferred from the operating bed to an ICU bed and transferred to the ICU in critical, but stable, condition.    All needle, sponge and instrument counts were correct at the end of the case.    Chris Espinal  Cardiothoracic Surgery  Pager: [phone]

## 2018-08-09 NOTE — CONSULTS
"[facility], [place]    Palliative Care Consultation Note    Patient: Jay Moses  Date of Admission:  8/8/2018    Requesting provider/team: Stef Herrera MD  Reason for consult: Goals of care  Decisional support  Patient and family support    Recommendations:  -Will continue to follow 1-2 times per week for ongoing support and goals of care, please page if needs arise  -Appreciate Palliative Care  support of patient and family    These recommendations have been discussed via this note.    Thank you for the opportunity to participate in the care of this patient and family. Our team will follow. Please feel free to contact the on-call Palliative provider with any urgent needs.    LUCITA Huynh CNS  Palliative Care Consult Team  Pager: [phone]    [facility] Inpatient Team Consult pager [phone] (M-F 8-4:30)  After-hours Answering Service [phone]   Palliative Clinic: [phone]       Assessment:  Jay Moses is a 78 year old male with a history of HFrEF who is s/p CABG x4 at [place] which was complicated by v fib arrest in the ICU. His chest was opened and placed on ECMO. He was taken to the cath lab where a stent was placed to the LAD distal to the LIMA to LAD anastomosis. He was transferred to [facility] for further management. He remains on ECMO with his chest open, requiring pressors, bleeding issues from his chest.    Social:          [place]       Support system: wife, 4 children, 10 grandchildren and 10 great grandchildren, two brothers       Functional status: would be driven to appointments by daughter       Occupation: , retired, works outside a lot now       Hobbies: woodworking, baking apple pies, playing games with children/grandchildren    Coping: family seemed to be in shock, they support each other and also find prayer helpful    Spiritual/Baptism:    Spiritual background: daughter " "endorses that \"just praying to god\" is good and seemed not to have a particular wily background    Prognostic Information: he is critically ill, he is stable right now, hope is to be able to have him recover. This has been discussed with wife, children, and family.    Advance Care Planning:        Decision making capacity: No       Disease understanding: fair understanding        Goals of Care: restorative       Health care directive: None in chart       Code Status:  Full Code       POLST There is no POLST (Physician orders for life-sustaining treatment) form on file for this patient      History of Present Illness   Sources of History:electronic health record and patient's family    Jay Moses is a 78 year old male with a history of HFrEF who is s/p CABG x4 at [facility] which was complicated by v fib arrest in the ICU. His chest was opened and placed on ECMO. He was taken to the cath lab where a stent was placed to the LAD distal to the LIMA to LAD anastomosis. He was transferred to [facility] for further management. He remains on ECMO with his chest open, requiring pressors, bleeding issues from his chest.    Palliative care consulted for patient requiring ECMO for goals of care and support of patient and family    ROS:  A comprehensive ROS has been negative other than stated in the HPI and below:   Palliative Symptom Review (0=no symptom/no concern, 1=mild, 2=moderate, 3=severe):  LYNNE given patient is intubated and sedated     Past Medical History:   No past medical history on file.       Past Surgical History:   No past surgical history on file.          Family History:   No family history on file.       Allergies:   No Known Allergies         Medications:   I have reviewed this patient's medication profile and medications given in the past 24 hours.    Cisatracurium drip  Fentanyl drip  Versed drip         Physical Exam:   Vital Signs: Temp: 97  F (36.1  C) Temp src: Esophageal     Heart Rate: 79 Resp: 16 " SpO2: 100 % O2 Device: Mechanical Ventilator    Weight: 202 lbs 6.12 oz    Physical Exam:  Constitutional: intubated, sedated, on VA ECMO, critically ill appearing, no otherwise distress  Lungs: No increased work of breathing on the ventilator  Neurologic: patient is sedated  Neuropsychiatric: LYNNE given AMS     Data reviewed:     ROUTINE ICU LABS (Last four results)  CMP  Recent Labs  Lab 08/09/18 0645 08/09/18 0232 08/09/18  0216 08/08/18  2300 08/08/18 2229   * 156*  --   --  148*   POTASSIUM 2.6* 2.4* Canceled, Test credited 2.6* 3.0*   CHLORIDE 119* 116*  --   --  114*   CO2 27 22  --   --  17*   ANIONGAP 9 19*  --   --  17*   * 341* 344*  --  460*   BUN 14 14  --   --  12   CR 0.90 0.92  --   --  0.80   GFRESTIMATED 81 79  --   --  >90   GFRESTBLACK >90 >90  --   --  >90   THADDEUS 8.5 8.8  --   --  7.8*   MAG  --  2.6*  --   --  2.2   PHOS  --  1.4*  --   --  2.6   PROTTOTAL  --  4.2*  --   --  3.8*   ALBUMIN  --  2.9*  --   --  2.7*   BILITOTAL  --  1.5*  --   --  1.0   ALKPHOS  --  34*  --   --  52   AST  --  29  --   --  34   ALT  --  17  --   --  19     CBC  Recent Labs  Lab 08/09/18 0645 08/09/18 0232 08/08/18 2229   WBC 8.5 6.1 13.2*   RBC 3.12* 2.17* 2.92*   HGB 9.1* 6.4* 8.6*   HCT 26.3* 18.4* 25.8*   MCV 84 85 88   MCH 29.2 29.5 29.5   MCHC 34.6 34.8 33.3   RDW 14.8 15.2* 14.9   PLT 69* 98* 113*     INR  Recent Labs  Lab 08/09/18 0645 08/09/18 0232 08/08/18 2229   INR 1.71* 2.10* 2.09*     Arterial Blood Gas  Recent Labs  Lab 08/09/18  0814 08/09/18  0645 08/09/18  0531 08/09/18  0419   PH 7.41 7.41 7.41 7.40   PCO2 41 43 41 38   PO2 465* 406* 397* 374*   HCO3 26 27 26 23   O2PER 80.0 100.0 80.0 80.0     LUCITA Huynh CNS  Palliative Care Consult Team  Pager: [phone]    [facility] Inpatient Team Consult pager [phone] (M-F 8-4:30)  After-hours Answering Service [phone]  Palliative Clinic: [phone]     Total time spent was 70 minutes,  >50% of time was spent  counseling and/or coordination of care regarding goals of care and support of family.

## 2018-08-10 ENCOUNTER — ALLIED HEALTH/NURSE VISIT (OUTPATIENT)
Dept: NEUROLOGY | Facility: CLINIC | Age: 78
DRG: 003 | End: 2018-08-10
Attending: PSYCHIATRY & NEUROLOGY
Payer: MEDICARE

## 2018-08-10 ENCOUNTER — APPOINTMENT (OUTPATIENT)
Dept: GENERAL RADIOLOGY | Facility: CLINIC | Age: 78
DRG: 003 | End: 2018-08-10
Attending: THORACIC SURGERY (CARDIOTHORACIC VASCULAR SURGERY)
Payer: MEDICARE

## 2018-08-10 VITALS
HEIGHT: 71 IN | TEMPERATURE: 99 F | BODY MASS INDEX: 30.8 KG/M2 | WEIGHT: 220.02 LBS | RESPIRATION RATE: 16 BRPM | OXYGEN SATURATION: 20 %

## 2018-08-10 DIAGNOSIS — R57.0 CARDIOGENIC SHOCK (H): Primary | ICD-10-CM

## 2018-08-10 LAB
ALBUMIN SERPL-MCNC: 2 G/DL (ref 3.4–5)
ALBUMIN SERPL-MCNC: 2.2 G/DL (ref 3.4–5)
ALBUMIN SERPL-MCNC: 2.3 G/DL (ref 3.4–5)
ALBUMIN SERPL-MCNC: 2.4 G/DL (ref 3.4–5)
ALBUMIN SERPL-MCNC: 2.4 G/DL (ref 3.4–5)
ALP SERPL-CCNC: 44 U/L (ref 40–150)
ALP SERPL-CCNC: 57 U/L (ref 40–150)
ALP SERPL-CCNC: 61 U/L (ref 40–150)
ALP SERPL-CCNC: 75 U/L (ref 40–150)
ALP SERPL-CCNC: 76 U/L (ref 40–150)
ALT SERPL W P-5'-P-CCNC: 1075 U/L (ref 0–70)
ALT SERPL W P-5'-P-CCNC: 1904 U/L (ref 0–70)
ALT SERPL W P-5'-P-CCNC: 2328 U/L (ref 0–70)
ALT SERPL W P-5'-P-CCNC: 2361 U/L (ref 0–70)
ALT SERPL W P-5'-P-CCNC: 324 U/L (ref 0–70)
ANION GAP SERPL CALCULATED.3IONS-SCNC: 13 MMOL/L (ref 3–14)
ANION GAP SERPL CALCULATED.3IONS-SCNC: 16 MMOL/L (ref 3–14)
ANION GAP SERPL CALCULATED.3IONS-SCNC: 22 MMOL/L (ref 3–14)
ANION GAP SERPL CALCULATED.3IONS-SCNC: 23 MMOL/L (ref 3–14)
ANION GAP SERPL CALCULATED.3IONS-SCNC: 25 MMOL/L (ref 3–14)
ANISOCYTOSIS BLD QL SMEAR: SLIGHT
APTT PPP: 115 SEC (ref 22–37)
APTT PPP: 90 SEC (ref 22–37)
APTT PPP: 96 SEC (ref 22–37)
AST SERPL W P-5'-P-CCNC: 1185 U/L (ref 0–45)
AST SERPL W P-5'-P-CCNC: 2745 U/L (ref 0–45)
AST SERPL W P-5'-P-CCNC: 3303 U/L (ref 0–45)
AST SERPL W P-5'-P-CCNC: 3649 U/L (ref 0–45)
AST SERPL W P-5'-P-CCNC: 365 U/L (ref 0–45)
AT III ACT/NOR PPP CHRO: 34 % (ref 85–135)
BASE DEFICIT BLDA-SCNC: 10.2 MMOL/L
BASE DEFICIT BLDA-SCNC: 10.5 MMOL/L
BASE DEFICIT BLDA-SCNC: 12.4 MMOL/L
BASE DEFICIT BLDA-SCNC: 4.4 MMOL/L
BASE DEFICIT BLDA-SCNC: 7.1 MMOL/L
BASE DEFICIT BLDA-SCNC: 7.9 MMOL/L
BASE DEFICIT BLDA-SCNC: 8 MMOL/L
BASE DEFICIT BLDV-SCNC: 7.8 MMOL/L
BASOPHILS # BLD AUTO: 0 10E9/L (ref 0–0.2)
BASOPHILS NFR BLD AUTO: 0 %
BASOPHILS NFR BLD AUTO: 0.1 %
BASOPHILS NFR BLD AUTO: 0.2 %
BILIRUB SERPL-MCNC: 2.1 MG/DL (ref 0.2–1.3)
BILIRUB SERPL-MCNC: 2.3 MG/DL (ref 0.2–1.3)
BILIRUB SERPL-MCNC: 2.4 MG/DL (ref 0.2–1.3)
BILIRUB SERPL-MCNC: 2.4 MG/DL (ref 0.2–1.3)
BILIRUB SERPL-MCNC: 2.5 MG/DL (ref 0.2–1.3)
BLD PROD TYP BPU: NORMAL
BLD UNIT ID BPU: 0
BLOOD PRODUCT CODE: NORMAL
BPU ID: NORMAL
BUN SERPL-MCNC: 13 MG/DL (ref 7–30)
BUN SERPL-MCNC: 16 MG/DL (ref 7–30)
BUN SERPL-MCNC: 20 MG/DL (ref 7–30)
BUN SERPL-MCNC: 20 MG/DL (ref 7–30)
BUN SERPL-MCNC: 21 MG/DL (ref 7–30)
CA-I BLD-MCNC: 4.1 MG/DL (ref 4.4–5.2)
CA-I BLD-MCNC: 4.3 MG/DL (ref 4.4–5.2)
CA-I BLD-MCNC: 4.4 MG/DL (ref 4.4–5.2)
CA-I SERPL ISE-MCNC: 3.9 MG/DL (ref 4.4–5.2)
CA-I SERPL ISE-MCNC: 4.1 MG/DL (ref 4.4–5.2)
CALCIUM SERPL-MCNC: 6.8 MG/DL (ref 8.5–10.1)
CALCIUM SERPL-MCNC: 7.5 MG/DL (ref 8.5–10.1)
CALCIUM SERPL-MCNC: 7.8 MG/DL (ref 8.5–10.1)
CALCIUM SERPL-MCNC: 7.8 MG/DL (ref 8.5–10.1)
CALCIUM SERPL-MCNC: 8 MG/DL (ref 8.5–10.1)
CHLORIDE SERPL-SCNC: 110 MMOL/L (ref 94–109)
CHLORIDE SERPL-SCNC: 110 MMOL/L (ref 94–109)
CHLORIDE SERPL-SCNC: 113 MMOL/L (ref 94–109)
CHLORIDE SERPL-SCNC: 114 MMOL/L (ref 94–109)
CHLORIDE SERPL-SCNC: 115 MMOL/L (ref 94–109)
CO2 SERPL-SCNC: 13 MMOL/L (ref 20–32)
CO2 SERPL-SCNC: 14 MMOL/L (ref 20–32)
CO2 SERPL-SCNC: 18 MMOL/L (ref 20–32)
CO2 SERPL-SCNC: 22 MMOL/L (ref 20–32)
CO2 SERPL-SCNC: 9 MMOL/L (ref 20–32)
CREAT SERPL-MCNC: 1.36 MG/DL (ref 0.66–1.25)
CREAT SERPL-MCNC: 1.63 MG/DL (ref 0.66–1.25)
CREAT SERPL-MCNC: 1.85 MG/DL (ref 0.66–1.25)
CREAT SERPL-MCNC: 1.89 MG/DL (ref 0.66–1.25)
CREAT SERPL-MCNC: 1.96 MG/DL (ref 0.66–1.25)
CRP SERPL-MCNC: 120 MG/L (ref 0–8)
D DIMER PPP FEU-MCNC: >20 UG/ML FEU (ref 0–0.5)
DIFFERENTIAL METHOD BLD: ABNORMAL
EOSINOPHIL # BLD AUTO: 0 10E9/L (ref 0–0.7)
EOSINOPHIL NFR BLD AUTO: 0 %
EOSINOPHIL NFR BLD AUTO: 0 %
EOSINOPHIL NFR BLD AUTO: 0.1 %
ERYTHROCYTE [DISTWIDTH] IN BLOOD BY AUTOMATED COUNT: 15.9 % (ref 10–15)
ERYTHROCYTE [DISTWIDTH] IN BLOOD BY AUTOMATED COUNT: 16.6 % (ref 10–15)
ERYTHROCYTE [DISTWIDTH] IN BLOOD BY AUTOMATED COUNT: 16.7 % (ref 10–15)
ERYTHROCYTE [DISTWIDTH] IN BLOOD BY AUTOMATED COUNT: 16.8 % (ref 10–15)
ERYTHROCYTE [SEDIMENTATION RATE] IN BLOOD BY WESTERGREN METHOD: 16 MM/H (ref 0–20)
FIBRINOGEN PPP-MCNC: 210 MG/DL (ref 200–420)
GFR SERPL CREATININE-BSD FRML MDRD: 33 ML/MIN/1.7M2
GFR SERPL CREATININE-BSD FRML MDRD: 35 ML/MIN/1.7M2
GFR SERPL CREATININE-BSD FRML MDRD: 35 ML/MIN/1.7M2
GFR SERPL CREATININE-BSD FRML MDRD: 41 ML/MIN/1.7M2
GFR SERPL CREATININE-BSD FRML MDRD: 51 ML/MIN/1.7M2
GLUCOSE BLD-MCNC: 116 MG/DL (ref 70–99)
GLUCOSE BLD-MCNC: 119 MG/DL (ref 70–99)
GLUCOSE BLDC GLUCOMTR-MCNC: 106 MG/DL (ref 70–99)
GLUCOSE BLDC GLUCOMTR-MCNC: 109 MG/DL (ref 70–99)
GLUCOSE BLDC GLUCOMTR-MCNC: 121 MG/DL (ref 70–99)
GLUCOSE BLDC GLUCOMTR-MCNC: 122 MG/DL (ref 70–99)
GLUCOSE BLDC GLUCOMTR-MCNC: 122 MG/DL (ref 70–99)
GLUCOSE BLDC GLUCOMTR-MCNC: 129 MG/DL (ref 70–99)
GLUCOSE BLDC GLUCOMTR-MCNC: 130 MG/DL (ref 70–99)
GLUCOSE BLDC GLUCOMTR-MCNC: 136 MG/DL (ref 70–99)
GLUCOSE BLDC GLUCOMTR-MCNC: 143 MG/DL (ref 70–99)
GLUCOSE BLDC GLUCOMTR-MCNC: 170 MG/DL (ref 70–99)
GLUCOSE BLDC GLUCOMTR-MCNC: 173 MG/DL (ref 70–99)
GLUCOSE BLDC GLUCOMTR-MCNC: 214 MG/DL (ref 70–99)
GLUCOSE SERPL-MCNC: 111 MG/DL (ref 70–99)
GLUCOSE SERPL-MCNC: 112 MG/DL (ref 70–99)
GLUCOSE SERPL-MCNC: 119 MG/DL (ref 70–99)
GLUCOSE SERPL-MCNC: 128 MG/DL (ref 70–99)
GLUCOSE SERPL-MCNC: 83 MG/DL (ref 70–99)
HCO3 BLD-SCNC: 12 MMOL/L (ref 21–28)
HCO3 BLD-SCNC: 14 MMOL/L (ref 21–28)
HCO3 BLD-SCNC: 16 MMOL/L (ref 21–28)
HCO3 BLD-SCNC: 18 MMOL/L (ref 21–28)
HCO3 BLD-SCNC: 19 MMOL/L (ref 21–28)
HCO3 BLD-SCNC: 22 MMOL/L (ref 21–28)
HCO3 BLDA-SCNC: 18 MMOL/L (ref 21–28)
HCO3 BLDV-SCNC: 19 MMOL/L (ref 21–28)
HCT VFR BLD AUTO: 22.2 % (ref 40–53)
HCT VFR BLD AUTO: 24.2 % (ref 40–53)
HCT VFR BLD AUTO: 25.6 % (ref 40–53)
HCT VFR BLD AUTO: 25.8 % (ref 40–53)
HGB BLD-MCNC: 7.4 G/DL (ref 13.3–17.7)
HGB BLD-MCNC: 8.2 G/DL (ref 13.3–17.7)
HGB BLD-MCNC: 8.3 G/DL (ref 13.3–17.7)
HGB BLD-MCNC: 8.6 G/DL (ref 13.3–17.7)
HGB FREE PLAS-MCNC: 40 MG/DL
IMM GRANULOCYTES # BLD: 0.1 10E9/L (ref 0–0.4)
IMM GRANULOCYTES # BLD: 0.2 10E9/L (ref 0–0.4)
IMM GRANULOCYTES NFR BLD: 0.8 %
IMM GRANULOCYTES NFR BLD: 2.1 %
INR PPP: 2.06 (ref 0.86–1.14)
INR PPP: 2.54 (ref 0.86–1.14)
INR PPP: 3.62 (ref 0.86–1.14)
INTERPRETATION ECG - MUSE: NORMAL
INTERPRETATION ECG - MUSE: NORMAL
KCT BLD-ACNC: 119 SEC (ref 75–150)
KCT BLD-ACNC: 135 SEC (ref 75–150)
KCT BLD-ACNC: 139 SEC (ref 75–150)
KCT BLD-ACNC: 140 SEC (ref 75–150)
KCT BLD-ACNC: 143 SEC (ref 75–150)
KCT BLD-ACNC: 148 SEC (ref 75–150)
KCT BLD-ACNC: 152 SEC (ref 75–150)
KCT BLD-ACNC: 156 SEC (ref 75–150)
KCT BLD-ACNC: 160 SEC (ref 75–150)
KCT BLD-ACNC: 164 SEC (ref 75–150)
KCT BLD-ACNC: 168 SEC (ref 75–150)
KCT BLD-ACNC: 172 SEC (ref 75–150)
KCT BLD-ACNC: 176 SEC (ref 75–150)
KCT BLD-ACNC: 180 SEC (ref 75–150)
KCT BLD-ACNC: 192 SEC (ref 75–150)
KCT BLD-ACNC: 200 SEC (ref 75–150)
LACTATE BLD-SCNC: 10.2 MMOL/L (ref 0.7–2)
LACTATE BLD-SCNC: 10.4 MMOL/L (ref 0.7–2)
LACTATE BLD-SCNC: 12.5 MMOL/L (ref 0.7–2)
LACTATE BLD-SCNC: 14.4 MMOL/L (ref 0.7–2)
LACTATE BLD-SCNC: 18 MMOL/L (ref 0.7–2)
LACTATE BLD-SCNC: 21 MMOL/L (ref 0.7–2)
LDH SERPL L TO P-CCNC: 2202 U/L (ref 85–227)
LMWH PPP CHRO-ACNC: <0.1 IU/ML
LMWH PPP CHRO-ACNC: <0.1 IU/ML
LYMPHOCYTES # BLD AUTO: 0.9 10E9/L (ref 0.8–5.3)
LYMPHOCYTES # BLD AUTO: 1.1 10E9/L (ref 0.8–5.3)
LYMPHOCYTES # BLD AUTO: 2 10E9/L (ref 0.8–5.3)
LYMPHOCYTES NFR BLD AUTO: 10.3 %
LYMPHOCYTES NFR BLD AUTO: 11.8 %
LYMPHOCYTES NFR BLD AUTO: 24.8 %
MAGNESIUM SERPL-MCNC: 2 MG/DL (ref 1.6–2.3)
MAGNESIUM SERPL-MCNC: 2.3 MG/DL (ref 1.6–2.3)
MCH RBC QN AUTO: 29.1 PG (ref 26.5–33)
MCH RBC QN AUTO: 29.4 PG (ref 26.5–33)
MCH RBC QN AUTO: 29.6 PG (ref 26.5–33)
MCH RBC QN AUTO: 29.7 PG (ref 26.5–33)
MCHC RBC AUTO-ENTMCNC: 32.4 G/DL (ref 31.5–36.5)
MCHC RBC AUTO-ENTMCNC: 33.3 G/DL (ref 31.5–36.5)
MCHC RBC AUTO-ENTMCNC: 33.3 G/DL (ref 31.5–36.5)
MCHC RBC AUTO-ENTMCNC: 33.9 G/DL (ref 31.5–36.5)
MCV RBC AUTO: 87 FL (ref 78–100)
MCV RBC AUTO: 89 FL (ref 78–100)
MCV RBC AUTO: 89 FL (ref 78–100)
MCV RBC AUTO: 90 FL (ref 78–100)
METAMYELOCYTES # BLD: 0.6 10E9/L
METAMYELOCYTES NFR BLD MANUAL: 8 %
MONOCYTES # BLD AUTO: 0.4 10E9/L (ref 0–1.3)
MONOCYTES # BLD AUTO: 0.7 10E9/L (ref 0–1.3)
MONOCYTES # BLD AUTO: 0.8 10E9/L (ref 0–1.3)
MONOCYTES NFR BLD AUTO: 5.3 %
MONOCYTES NFR BLD AUTO: 7.9 %
MONOCYTES NFR BLD AUTO: 8.2 %
MYELOCYTES # BLD: 0.2 10E9/L
MYELOCYTES NFR BLD MANUAL: 2.6 %
NEUTROPHILS # BLD AUTO: 4.7 10E9/L (ref 1.6–8.3)
NEUTROPHILS # BLD AUTO: 7 10E9/L (ref 1.6–8.3)
NEUTROPHILS # BLD AUTO: 7.3 10E9/L (ref 1.6–8.3)
NEUTROPHILS NFR BLD AUTO: 59.3 %
NEUTROPHILS NFR BLD AUTO: 77.6 %
NEUTROPHILS NFR BLD AUTO: 80.9 %
NRBC # BLD AUTO: 0.1 10*3/UL
NRBC # BLD AUTO: 0.2 10*3/UL
NRBC # BLD AUTO: 0.5 10*3/UL
NRBC BLD AUTO-RTO: 1 /100
NRBC BLD AUTO-RTO: 2 /100
NRBC BLD AUTO-RTO: 6 /100
NUM BPU REQUESTED: 1
NUM BPU REQUESTED: 2
O2/TOTAL GAS SETTING VFR VENT: 50 %
OXYHGB MFR BLD: 96 % (ref 92–100)
OXYHGB MFR BLD: 97 % (ref 92–100)
OXYHGB MFR BLD: 98 % (ref 92–100)
OXYHGB MFR BLDA: 97 % (ref 75–100)
OXYHGB MFR BLDV: 63 %
PCO2 BLD: 23 MM HG (ref 35–45)
PCO2 BLD: 26 MM HG (ref 35–45)
PCO2 BLD: 36 MM HG (ref 35–45)
PCO2 BLD: 38 MM HG (ref 35–45)
PCO2 BLD: 41 MM HG (ref 35–45)
PCO2 BLD: 45 MM HG (ref 35–45)
PCO2 BLDA: 38 MM HG (ref 35–45)
PCO2 BLDV: 46 MM HG (ref 40–50)
PH BLD: 7.26 PH (ref 7.35–7.45)
PH BLD: 7.28 PH (ref 7.35–7.45)
PH BLD: 7.29 PH (ref 7.35–7.45)
PH BLD: 7.3 PH (ref 7.35–7.45)
PH BLD: 7.33 PH (ref 7.35–7.45)
PH BLD: 7.35 PH (ref 7.35–7.45)
PH BLDA: 7.29 PH (ref 7.35–7.45)
PH BLDV: 7.23 PH (ref 7.32–7.43)
PHOSPHATE SERPL-MCNC: 10.3 MG/DL (ref 2.5–4.5)
PHOSPHATE SERPL-MCNC: 8.9 MG/DL (ref 2.5–4.5)
PHOSPHATE SERPL-MCNC: 9.1 MG/DL (ref 2.5–4.5)
PHOSPHATE SERPL-MCNC: 9.3 MG/DL (ref 2.5–4.5)
PHOSPHATE SERPL-MCNC: 9.7 MG/DL (ref 2.5–4.5)
PLATELET # BLD AUTO: 104 10E9/L (ref 150–450)
PLATELET # BLD AUTO: 81 10E9/L (ref 150–450)
PLATELET # BLD AUTO: 83 10E9/L (ref 150–450)
PLATELET # BLD AUTO: 89 10E9/L (ref 150–450)
PLATELET # BLD EST: ABNORMAL 10*3/UL
PO2 BLD: 181 MM HG (ref 80–105)
PO2 BLD: 207 MM HG (ref 80–105)
PO2 BLD: 243 MM HG (ref 80–105)
PO2 BLD: 245 MM HG (ref 80–105)
PO2 BLD: 256 MM HG (ref 80–105)
PO2 BLD: 266 MM HG (ref 80–105)
PO2 BLDA: 188 MM HG (ref 80–105)
PO2 BLDV: 40 MM HG (ref 25–47)
POTASSIUM BLD-SCNC: 7.1 MMOL/L (ref 3.4–5.3)
POTASSIUM BLD-SCNC: 7.8 MMOL/L (ref 3.4–5.3)
POTASSIUM SERPL-SCNC: 7.4 MMOL/L (ref 3.4–5.3)
POTASSIUM SERPL-SCNC: 7.5 MMOL/L (ref 3.4–5.3)
POTASSIUM SERPL-SCNC: 7.5 MMOL/L (ref 3.4–5.3)
POTASSIUM SERPL-SCNC: 7.6 MMOL/L (ref 3.4–5.3)
POTASSIUM SERPL-SCNC: 7.9 MMOL/L (ref 3.4–5.3)
POTASSIUM SERPL-SCNC: 8.2 MMOL/L (ref 3.4–5.3)
POTASSIUM SERPL-SCNC: 8.2 MMOL/L (ref 3.4–5.3)
PROT SERPL-MCNC: 3.2 G/DL (ref 6.8–8.8)
PROT SERPL-MCNC: 3.7 G/DL (ref 6.8–8.8)
PROT SERPL-MCNC: 3.8 G/DL (ref 6.8–8.8)
PROT SERPL-MCNC: 3.9 G/DL (ref 6.8–8.8)
PROT SERPL-MCNC: 3.9 G/DL (ref 6.8–8.8)
RBC # BLD AUTO: 2.5 10E12/L (ref 4.4–5.9)
RBC # BLD AUTO: 2.79 10E12/L (ref 4.4–5.9)
RBC # BLD AUTO: 2.85 10E12/L (ref 4.4–5.9)
RBC # BLD AUTO: 2.9 10E12/L (ref 4.4–5.9)
SODIUM SERPL-SCNC: 146 MMOL/L (ref 133–144)
SODIUM SERPL-SCNC: 147 MMOL/L (ref 133–144)
SODIUM SERPL-SCNC: 147 MMOL/L (ref 133–144)
SODIUM SERPL-SCNC: 149 MMOL/L (ref 133–144)
SODIUM SERPL-SCNC: 150 MMOL/L (ref 133–144)
TRANSFUSION STATUS PATIENT QL: NORMAL
TROPONIN I SERPL-MCNC: 13.44 UG/L (ref 0–0.04)
TROPONIN I SERPL-MCNC: 19.85 UG/L (ref 0–0.04)
TROPONIN I SERPL-MCNC: 39.17 UG/L (ref 0–0.04)
WBC # BLD AUTO: 8 10E9/L (ref 4–11)
WBC # BLD AUTO: 8.3 10E9/L (ref 4–11)
WBC # BLD AUTO: 8.7 10E9/L (ref 4–11)
WBC # BLD AUTO: 9.4 10E9/L (ref 4–11)

## 2018-08-10 PROCEDURE — 36415 COLL VENOUS BLD VENIPUNCTURE: CPT | Performed by: INTERNAL MEDICINE

## 2018-08-10 PROCEDURE — 85300 ANTITHROMBIN III ACTIVITY: CPT | Performed by: GENERAL ACUTE CARE HOSPITAL

## 2018-08-10 PROCEDURE — 25800025 ZZH RX 258: Performed by: THORACIC SURGERY (CARDIOTHORACIC VASCULAR SURGERY)

## 2018-08-10 PROCEDURE — 85025 COMPLETE CBC W/AUTO DIFF WBC: CPT | Performed by: GENERAL ACUTE CARE HOSPITAL

## 2018-08-10 PROCEDURE — 87186 SC STD MICRODIL/AGAR DIL: CPT | Performed by: STUDENT IN AN ORGANIZED HEALTH CARE EDUCATION/TRAINING PROGRAM

## 2018-08-10 PROCEDURE — 25800025 ZZH RX 258: Performed by: SURGERY

## 2018-08-10 PROCEDURE — 85027 COMPLETE CBC AUTOMATED: CPT | Performed by: STUDENT IN AN ORGANIZED HEALTH CARE EDUCATION/TRAINING PROGRAM

## 2018-08-10 PROCEDURE — P9016 RBC LEUKOCYTES REDUCED: HCPCS | Performed by: THORACIC SURGERY (CARDIOTHORACIC VASCULAR SURGERY)

## 2018-08-10 PROCEDURE — 85347 COAGULATION TIME ACTIVATED: CPT

## 2018-08-10 PROCEDURE — 83735 ASSAY OF MAGNESIUM: CPT | Performed by: STUDENT IN AN ORGANIZED HEALTH CARE EDUCATION/TRAINING PROGRAM

## 2018-08-10 PROCEDURE — 93010 ELECTROCARDIOGRAM REPORT: CPT | Performed by: INTERNAL MEDICINE

## 2018-08-10 PROCEDURE — 25000128 H RX IP 250 OP 636: Performed by: INTERNAL MEDICINE

## 2018-08-10 PROCEDURE — 80053 COMPREHEN METABOLIC PANEL: CPT | Performed by: GENERAL ACUTE CARE HOSPITAL

## 2018-08-10 PROCEDURE — 85652 RBC SED RATE AUTOMATED: CPT | Performed by: GENERAL ACUTE CARE HOSPITAL

## 2018-08-10 PROCEDURE — 82330 ASSAY OF CALCIUM: CPT | Performed by: STUDENT IN AN ORGANIZED HEALTH CARE EDUCATION/TRAINING PROGRAM

## 2018-08-10 PROCEDURE — 85520 HEPARIN ASSAY: CPT | Performed by: GENERAL ACUTE CARE HOSPITAL

## 2018-08-10 PROCEDURE — 80053 COMPREHEN METABOLIC PANEL: CPT | Performed by: THORACIC SURGERY (CARDIOTHORACIC VASCULAR SURGERY)

## 2018-08-10 PROCEDURE — 85730 THROMBOPLASTIN TIME PARTIAL: CPT | Performed by: GENERAL ACUTE CARE HOSPITAL

## 2018-08-10 PROCEDURE — 83735 ASSAY OF MAGNESIUM: CPT | Performed by: THORACIC SURGERY (CARDIOTHORACIC VASCULAR SURGERY)

## 2018-08-10 PROCEDURE — 87070 CULTURE OTHR SPECIMN AEROBIC: CPT | Performed by: STUDENT IN AN ORGANIZED HEALTH CARE EDUCATION/TRAINING PROGRAM

## 2018-08-10 PROCEDURE — 85730 THROMBOPLASTIN TIME PARTIAL: CPT | Performed by: STUDENT IN AN ORGANIZED HEALTH CARE EDUCATION/TRAINING PROGRAM

## 2018-08-10 PROCEDURE — 25000128 H RX IP 250 OP 636: Performed by: GENERAL ACUTE CARE HOSPITAL

## 2018-08-10 PROCEDURE — 80053 COMPREHEN METABOLIC PANEL: CPT | Performed by: STUDENT IN AN ORGANIZED HEALTH CARE EDUCATION/TRAINING PROGRAM

## 2018-08-10 PROCEDURE — 87077 CULTURE AEROBIC IDENTIFY: CPT | Performed by: STUDENT IN AN ORGANIZED HEALTH CARE EDUCATION/TRAINING PROGRAM

## 2018-08-10 PROCEDURE — 40000048 ZZH STATISTIC DAILY SWAN MONITORING

## 2018-08-10 PROCEDURE — 25000128 H RX IP 250 OP 636

## 2018-08-10 PROCEDURE — 84484 ASSAY OF TROPONIN QUANT: CPT | Performed by: STUDENT IN AN ORGANIZED HEALTH CARE EDUCATION/TRAINING PROGRAM

## 2018-08-10 PROCEDURE — 25000125 ZZHC RX 250: Performed by: INTERNAL MEDICINE

## 2018-08-10 PROCEDURE — 40000275 ZZH STATISTIC RCP TIME EA 10 MIN

## 2018-08-10 PROCEDURE — 86140 C-REACTIVE PROTEIN: CPT | Performed by: GENERAL ACUTE CARE HOSPITAL

## 2018-08-10 PROCEDURE — P9037 PLATE PHERES LEUKOREDU IRRAD: HCPCS | Performed by: STUDENT IN AN ORGANIZED HEALTH CARE EDUCATION/TRAINING PROGRAM

## 2018-08-10 PROCEDURE — 85520 HEPARIN ASSAY: CPT | Performed by: STUDENT IN AN ORGANIZED HEALTH CARE EDUCATION/TRAINING PROGRAM

## 2018-08-10 PROCEDURE — 85610 PROTHROMBIN TIME: CPT | Performed by: GENERAL ACUTE CARE HOSPITAL

## 2018-08-10 PROCEDURE — 25000128 H RX IP 250 OP 636: Performed by: THORACIC SURGERY (CARDIOTHORACIC VASCULAR SURGERY)

## 2018-08-10 PROCEDURE — 83051 HEMOGLOBIN PLASMA: CPT | Performed by: GENERAL ACUTE CARE HOSPITAL

## 2018-08-10 PROCEDURE — 87040 BLOOD CULTURE FOR BACTERIA: CPT | Performed by: INTERNAL MEDICINE

## 2018-08-10 PROCEDURE — 84132 ASSAY OF SERUM POTASSIUM: CPT | Performed by: SURGERY

## 2018-08-10 PROCEDURE — 25000125 ZZHC RX 250: Performed by: THORACIC SURGERY (CARDIOTHORACIC VASCULAR SURGERY)

## 2018-08-10 PROCEDURE — 84132 ASSAY OF SERUM POTASSIUM: CPT | Performed by: STUDENT IN AN ORGANIZED HEALTH CARE EDUCATION/TRAINING PROGRAM

## 2018-08-10 PROCEDURE — 85379 FIBRIN DEGRADATION QUANT: CPT | Performed by: GENERAL ACUTE CARE HOSPITAL

## 2018-08-10 PROCEDURE — 00000146 ZZHCL STATISTIC GLUCOSE BY METER IP

## 2018-08-10 PROCEDURE — 83615 LACTATE (LD) (LDH) ENZYME: CPT | Performed by: GENERAL ACUTE CARE HOSPITAL

## 2018-08-10 PROCEDURE — 5A1D90Z PERFORMANCE OF URINARY FILTRATION, CONTINUOUS, GREATER THAN 18 HOURS PER DAY: ICD-10-PCS | Performed by: INTERNAL MEDICINE

## 2018-08-10 PROCEDURE — 25000128 H RX IP 250 OP 636: Performed by: STUDENT IN AN ORGANIZED HEALTH CARE EDUCATION/TRAINING PROGRAM

## 2018-08-10 PROCEDURE — 71045 X-RAY EXAM CHEST 1 VIEW: CPT

## 2018-08-10 PROCEDURE — 85384 FIBRINOGEN ACTIVITY: CPT | Performed by: GENERAL ACUTE CARE HOSPITAL

## 2018-08-10 PROCEDURE — 82330 ASSAY OF CALCIUM: CPT | Performed by: GENERAL ACUTE CARE HOSPITAL

## 2018-08-10 PROCEDURE — 84100 ASSAY OF PHOSPHORUS: CPT | Performed by: THORACIC SURGERY (CARDIOTHORACIC VASCULAR SURGERY)

## 2018-08-10 PROCEDURE — 85025 COMPLETE CBC W/AUTO DIFF WBC: CPT | Performed by: STUDENT IN AN ORGANIZED HEALTH CARE EDUCATION/TRAINING PROGRAM

## 2018-08-10 PROCEDURE — 82805 BLOOD GASES W/O2 SATURATION: CPT | Performed by: STUDENT IN AN ORGANIZED HEALTH CARE EDUCATION/TRAINING PROGRAM

## 2018-08-10 PROCEDURE — 82947 ASSAY GLUCOSE BLOOD QUANT: CPT | Performed by: STUDENT IN AN ORGANIZED HEALTH CARE EDUCATION/TRAINING PROGRAM

## 2018-08-10 PROCEDURE — 84100 ASSAY OF PHOSPHORUS: CPT | Performed by: STUDENT IN AN ORGANIZED HEALTH CARE EDUCATION/TRAINING PROGRAM

## 2018-08-10 PROCEDURE — 90947 DIALYSIS REPEATED EVAL: CPT

## 2018-08-10 PROCEDURE — 25000125 ZZHC RX 250: Performed by: GENERAL ACUTE CARE HOSPITAL

## 2018-08-10 PROCEDURE — 83735 ASSAY OF MAGNESIUM: CPT | Performed by: GENERAL ACUTE CARE HOSPITAL

## 2018-08-10 PROCEDURE — 83605 ASSAY OF LACTIC ACID: CPT | Performed by: STUDENT IN AN ORGANIZED HEALTH CARE EDUCATION/TRAINING PROGRAM

## 2018-08-10 PROCEDURE — 85610 PROTHROMBIN TIME: CPT | Performed by: STUDENT IN AN ORGANIZED HEALTH CARE EDUCATION/TRAINING PROGRAM

## 2018-08-10 PROCEDURE — 40000014 ZZH STATISTIC ARTERIAL MONITORING DAILY

## 2018-08-10 PROCEDURE — 93005 ELECTROCARDIOGRAM TRACING: CPT

## 2018-08-10 PROCEDURE — 25000128 H RX IP 250 OP 636: Performed by: SURGERY

## 2018-08-10 PROCEDURE — 84100 ASSAY OF PHOSPHORUS: CPT | Performed by: GENERAL ACUTE CARE HOSPITAL

## 2018-08-10 PROCEDURE — 94003 VENT MGMT INPAT SUBQ DAY: CPT

## 2018-08-10 PROCEDURE — 87106 FUNGI IDENTIFICATION YEAST: CPT | Performed by: STUDENT IN AN ORGANIZED HEALTH CARE EDUCATION/TRAINING PROGRAM

## 2018-08-10 PROCEDURE — 25000132 ZZH RX MED GY IP 250 OP 250 PS 637: Performed by: SURGERY

## 2018-08-10 PROCEDURE — 33949 ECMO/ECLS DAILY MGMT ARTERY: CPT

## 2018-08-10 PROCEDURE — 84484 ASSAY OF TROPONIN QUANT: CPT | Performed by: GENERAL ACUTE CARE HOSPITAL

## 2018-08-10 PROCEDURE — 40000196 ZZH STATISTIC RAPCV CVP MONITORING

## 2018-08-10 RX ORDER — DEXTROSE MONOHYDRATE 25 G/50ML
25 INJECTION, SOLUTION INTRAVENOUS ONCE
Status: COMPLETED | OUTPATIENT
Start: 2018-08-10 | End: 2018-08-10

## 2018-08-10 RX ORDER — FENTANYL CITRATE 50 UG/ML
50-100 INJECTION, SOLUTION INTRAMUSCULAR; INTRAVENOUS EVERY 10 MIN PRN
Status: DISCONTINUED | OUTPATIENT
Start: 2018-08-10 | End: 2018-08-10 | Stop reason: HOSPADM

## 2018-08-10 RX ORDER — DEXTROSE MONOHYDRATE 100 MG/ML
INJECTION, SOLUTION INTRAVENOUS CONTINUOUS
Status: ACTIVE | OUTPATIENT
Start: 2018-08-10 | End: 2018-08-10

## 2018-08-10 RX ORDER — FENTANYL CITRATE 50 UG/ML
50-100 INJECTION, SOLUTION INTRAMUSCULAR; INTRAVENOUS EVERY 30 MIN PRN
Status: DISCONTINUED | OUTPATIENT
Start: 2018-08-10 | End: 2018-08-10 | Stop reason: HOSPADM

## 2018-08-10 RX ADMIN — DEXTROSE MONOHYDRATE 25 G: 25 INJECTION, SOLUTION INTRAVENOUS at 01:47

## 2018-08-10 RX ADMIN — MINERAL OIL AND WHITE PETROLATUM: 30; 940 OINTMENT OPHTHALMIC at 00:39

## 2018-08-10 RX ADMIN — EPINEPHRINE 0.02 MCG/KG/MIN: 1 INJECTION PARENTERAL at 02:43

## 2018-08-10 RX ADMIN — DEXTROSE MONOHYDRATE: 100 INJECTION, SOLUTION INTRAVENOUS at 11:13

## 2018-08-10 RX ADMIN — Medication 20.5 ML/KG/HR: at 11:23

## 2018-08-10 RX ADMIN — NOREPINEPHRINE BITARTRATE 0.05 MCG/KG/MIN: 1 INJECTION INTRAVENOUS at 02:44

## 2018-08-10 RX ADMIN — Medication 12.5 ML/KG/HR: at 07:21

## 2018-08-10 RX ADMIN — PIPERACILLIN AND TAZOBACTAM 3.38 G: 3; .375 INJECTION, POWDER, LYOPHILIZED, FOR SOLUTION INTRAVENOUS; PARENTERAL at 00:44

## 2018-08-10 RX ADMIN — PANTOPRAZOLE SODIUM 40 MG: 40 INJECTION, POWDER, FOR SOLUTION INTRAVENOUS at 08:01

## 2018-08-10 RX ADMIN — CALCIUM CHLORIDE 1 G: 100 INJECTION, SOLUTION INTRAVENOUS at 06:00

## 2018-08-10 RX ADMIN — Medication 6 MG/HR: at 09:31

## 2018-08-10 RX ADMIN — DEXTROSE MONOHYDRATE 2 MCG/KG/MIN: 50 INJECTION, SOLUTION INTRAVENOUS at 06:58

## 2018-08-10 RX ADMIN — DEXTROSE MONOHYDRATE 25 G: 500 INJECTION PARENTERAL at 06:23

## 2018-08-10 RX ADMIN — MINERAL OIL AND WHITE PETROLATUM: 30; 940 OINTMENT OPHTHALMIC at 08:01

## 2018-08-10 RX ADMIN — SODIUM BICARBONATE 50 MEQ: 84 INJECTION INTRAVENOUS at 06:48

## 2018-08-10 RX ADMIN — PIPERACILLIN AND TAZOBACTAM 3.38 G: 3; .375 INJECTION, POWDER, LYOPHILIZED, FOR SOLUTION INTRAVENOUS; PARENTERAL at 06:00

## 2018-08-10 RX ADMIN — Medication 20.5 ML/KG/HR: at 10:39

## 2018-08-10 RX ADMIN — Medication 12.5 ML/KG/HR: at 02:33

## 2018-08-10 RX ADMIN — SODIUM BICARBONATE 100 MEQ: 84 INJECTION INTRAVENOUS at 08:47

## 2018-08-10 RX ADMIN — FENTANYL CITRATE 100 MCG/HR: 50 INJECTION, SOLUTION INTRAMUSCULAR; INTRAVENOUS at 10:39

## 2018-08-10 RX ADMIN — HUMAN INSULIN 9 UNITS: 100 INJECTION, SOLUTION SUBCUTANEOUS at 06:23

## 2018-08-10 RX ADMIN — LIDOCAINE HYDROCHLORIDE 1 MG/MIN: 8 INJECTION, SOLUTION INTRAVENOUS at 08:00

## 2018-08-10 RX ADMIN — Medication 12.5 ML/KG/HR: at 12:10

## 2018-08-10 RX ADMIN — SODIUM BICARBONATE 50 MEQ: 84 INJECTION INTRAVENOUS at 03:09

## 2018-08-10 RX ADMIN — VASOPRESSIN 4 UNITS/HR: 20 INJECTION INTRAVENOUS at 07:59

## 2018-08-10 RX ADMIN — Medication 50 MEQ: at 06:48

## 2018-08-10 RX ADMIN — HUMAN INSULIN 9 UNITS: 100 INJECTION, SOLUTION SUBCUTANEOUS at 01:47

## 2018-08-10 RX ADMIN — CALCIUM CHLORIDE 2 G: 100 INJECTION, SOLUTION INTRAVENOUS at 11:34

## 2018-08-10 ASSESSMENT — ACTIVITIES OF DAILY LIVING (ADL)
ADLS_ACUITY_SCORE: 16

## 2018-08-10 NOTE — PROGRESS NOTES
ECMO Shift Summary:    Patient remains on VA ECMO, all equipment is functioning and alarms are appropriately set. RPM's 3800 with flow range 3.72-4.15 L/min. Sweep gas is at 6 LPM and FiO2 60%. Circuit remains free of air; clot and fibrin are seen on the venous side of the oxy. Cannulas are secure with no bleeding from site. Extremities are cool, dusky. Suctioned ETT for no secretions.    Vent settings:  Ventilation Mode: CMV/AC  (Continuous Mandatory Ventilation/ Assist Control)  FiO2 (%): 50 %  Rate Set (breaths/minute): 16 breaths/min  Tidal Volume Set (mL): 450 mL  PEEP (cm H2O): 7 cmH2O  Oxygen Concentration (%): 50 %  Resp: 16.    Heparin is running at 400 u/hr, ACT range 164-168.    Urine output is 22 mls/hr, blood loss was 55 mls/hr from chest tubes. Product given included 1 unit each of FFP, platelets, and RBC's, and 500 mls Albumin.      Intake/Output Summary (Last 24 hours) at 08/10/18 0604  Last data filed at 08/10/18 0600   Gross per 24 hour   Intake          9089.87 ml   Output             3391 ml   Net          5698.87 ml       ECHO:  No results found for this or any previous visit.No results found for this or any previous visit.    CXR:  Recent Results (from the past 24 hour(s))   XR Chest Port 1 View    Narrative    Exam:  Chest X-ray 8/9/2018 7:59 AM    History: Check endotracheal tube placement and ECLS cannula placement.  DO NOT log-roll patient.  Place film under patient using 6 MAN LIFT;     Comparison: 8/9/2018    Findings: Portable chest radiograph. Interval placement of right chest  tube. Left chest tube has advanced, tip now projecting near the left  lung base. Mediastinal drains. ECMO catheter with tip terminating at  the mid left atrium. Endotracheal tube in stable position. [place]  catheter with tip in the main pulmonary artery. Enteric tube  projecting over the esophagus, tip not visualized. Esophageal  temperature probe. Cardiac size is stable. Congested pulmonary  vasculature. Left  pleural effusion with overlying opacities. No  definite pneumothorax.       Impression    Impression:  1. Interval placement of right chest tube and advancement of the left  chest tube.  2. Inferior approach ECMO catheter tip in the mid right atrium.  3. Left pleural effusion.  4. Hilar fullness with diffuse linear airspace opacities, likely  pulmonary edema.  5. Endotracheal tube in stable position    I have personally reviewed the examination and initial interpretation  and I agree with the findings.    AC PALACIOS MD   XR Chest Port 1 View    Narrative    Exam:  Chest X-ray 8/9/2018 1:56 PM    History: 78M s/p CAB x4 c/b Vfib arrest s/p washout x 2 and VA ECMO  cannulation. Assess positioning of PA catheter;     Comparison: Same day chest radiograph    Findings: Single portable chest radiograph. Endotracheal tube  projecting over the mid thoracic trachea. Right [place] catheter  with tip terminating in the proximal main pulmonary trunk, minimally  advanced compared to prior. Bilateral chest tubes in stable position.  Dual mediastinal drains. Inferior projectile catheter terminating in  the low right atrium. Enteric tube in stable position. Cardiac  silhouette size is stable. Congestive pulmonary vasculature. Fluffy  airspace opacity overlying the left apex. Left pleural effusion. No  pneumothorax.      Impression    Impression:  1. Right [place]-Kalie catheter appears to be slightly advanced, still  within the proximal main pulmonary artery.  2. New left apical opacity, may represent developing loculated  hematoma. Recommend follow-up radiograph.  3. Left pleural effusion with bilateral perihilar fullness, suggestive  of pulmonary edema.  4. Additional support devices in stable position.     [Result: Left apical opacity]    Finding was identified on 8/9/2018 3:39 PM.     Dr. Herrera was contacted by Dr. Adorno at 8/9/2018 3:39 PM and  verbalized understanding of the urgent finding.     I have personally reviewed  the examination and initial interpretation  and I agree with the findings.    AC PALACIOS MD   XR Fluoro Port Time 0/1 Hour    Narrative    This exam was marked as non-reportable because it will not be read by a   radiologist or a [place] non-radiologist provider.             XR Chest Port 1 View    Narrative    XR CHEST PORT 1 VW  8/9/2018 3:19 PM      HISTORY: SWAN Placement;     COMPARISON: Radiograph earlier same day    FINDINGS: Right IJ [place]-Kal[place] catheter is slightly advanced anteriorly  positioned, tip projecting over the central right pulmonary artery.  Postsurgical changes in the chest. ET tube in proper position.  Esophageal temperature probe is in the mid esophagus. Bilateral chest  tubes are noted. ECMO cannula projecting over the right atrium. Stable  cardiac silhouette. Bilateral perihilar airspace opacities, slightly  increased. There is increased rounded opacities projecting over the  left upper chest in the superior aspect of the left pleural space.      Impression    IMPRESSION:   1. Rapidly accumulating hematoma in the superior aspect of the left  pleural space.  2. Interval repositioning of right [place] catheter, tip projecting  over the right pulmonary artery.  3. Slightly increased bilateral perihilar opacities, likely pulmonary  edema and/or atelectasis.    [Result: Rapidly accumulating hematoma in the left apical pleural  space.]    Finding was identified on 8/9/2018 3:20 PM.     Dr. Fletcher was contacted by Dr. Walters at 8/9/2018 4:22 PM and  verbalized understanding of the urgent finding.     I have personally reviewed the examination and initial interpretation  and I agree with the findings.    AC PALACIOS MD   US Lower Extremity Arterial Duplex Bilateral Port    Narrative    Exam: Duplex ultrasound of bilateral lower extremity arteries dated  8/9/2018 3:31 PM     Clinical information: Baseline to assess blood flow to Lower  Extremities (VA ECMO);      Comparison: None  available    Technique: Includes Gray Scale images, color Doppler, spectral Doppler  waveforms and velocities with appropriate angles of 60 degrees or  less.    Ordering provider: Irvin oCppola    Findings:     Right lower extremity:     Abnormal lower extremity waveforms, monophasic arterial prominence,  likely secondary to ECMO.    Common femoral artery: 35 cm/sec.   Deep femoral artery: 36 cm/sec.   Proximal SFA: 16 cm/sec.   Mid SFA: 29 cm/sec.   Distal SFA: 18 cm/sec.   Distal SFA into collateral: 62 cm/sec  Distal SFA, distal to collateral: No flow, likely chronically occluded  Popliteal artery proximal/mid: No flow likely chronically occluded  Popliteal artery distal: 4 cm/sec.   PTA ankle: 19 cm/sec.   EUNICE ankle: No flow.    Left lower extremity:    Bandage obscuring the common femoral artery and deep femoral artery.  Proximal SFA: 50 cm/sec.   Mid SFA: 37 cm/sec.   Distal SFA: 38 cm/sec.   Popliteal artery: 15 cm/sec.   PTA ankle: 29 cm/sec.   EUNICE ankle: 20 cm/sec.       Impression    Impression:     1. 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 There is a absence of flow in the  PTA..  There are abnormal waveforms likely secondary to ECMO.  2. Left leg: Maintained vascular flow lower extremity vasculature  including the 18th PTA..  There are abnormal waveforms, likely  secondary to    Guidelines:  [facility] duplex criteria for lower limb arterial  occlusive disease  -Percent stenosis- Normal (1-19%): Peak systolic velocity (cm/s):  <150, End-diastolic velocity (cm/s): <40, Velocity ration (Vr): <1.5,  Distal arterial waveform: Triphasic  -Percent stenosis- 20-49%: Peak systolic velocity (cm/s): 150-200,  End-diastolic velocity (cm/s): <40, Velocity ration (Vr): 1.5-2.0,  Distal arterial waveform: Triphasic  -Percent stenosis- 50-75%: Peak systolic velocity  (cm/s): 200-300,  End-diastolic velocity (cm/s): <90, Velocity ration (Vr): 2.0-3.9,  Distal arterial waveform: Poststenotic turbulence distal to stenosis,  monophasic distal waveform  -Percent stenosis- >75%: Peak systolic velocity (cm/s): >300,  End-diastolic velocity (cm/s): <90, Velocity ration (Vr): >4.0, Distal  arterial waveform: Dampened distal waveform and low PSV/EDV* in the  stenosis  -Percent stenosis- Occlusion: Absent flow by color Doppler/pulsed  Doppler spectral analysis; length of occlusion estimated from distance  between exit and reentry collateral arteries  *PSV = peak systolic velocity, EDV = end-diastolic velocity  http://link.hyde.com/chapter/10.1007/359-8-8198-4005-4_23/fulltext  html   XR Chest Port 1 View    Narrative    1. Decreased left apical opacities, presumed resolving hematoma.  2. Small bilateral pleural effusions and adjacent basilar atelectasis  and/or consolidation.  3. Stable support devices.       Labs:    Recent Labs  Lab 08/10/18  0411 08/10/18  0205 08/10/18  0021 08/09/18  2226   PH 7.29* 7.28* 7.30* 7.34*   PCO2 38 41 45 42   PO2 181* 245* 243* 238*   HCO3 18* 19* 22 23   O2PER 50 50 50 50.0       Lab Results   Component Value Date    HGB 8.6 (L) 08/10/2018    PHGB <30 08/09/2018     (L) 08/10/2018    FIBR 210 08/10/2018    INR 2.54 (H) 08/10/2018    PTT 96 (H) 08/10/2018    DD >20.0 (H) 08/10/2018    AXA <0.10 08/10/2018    ANTCH 34 (L) 08/09/2018         Plan is to continue ECMO support.      Divina Quintero, RRT  8/10/2018 6:04 AM

## 2018-08-10 NOTE — PLAN OF CARE
Problem: Patient Care Overview  Goal: Plan of Care/Patient Progress Review  Outcome: Declining  Afebrile, BPs stable MAPs 65-75 on stable pressor requirements. Currently on 0.02 mcg/kg/mi Epi, 0.05 mcg/kg/min Norepi, and 4 units/hr Vaso. % AV paced via TPM. Settings DDD rate 80.  Pacer paused x2 with asystole underlying. ECMO speed 3800 RPM, Flows around 4 LPM, Sweep 6 LPM, FiO2 60% on circuit. Vent settings CMV, rate 16, Vt 450, PEEP 7, 50% FiO2. CT x4 (2 Med, 2 Pleurals) with decreasing drainage. Mediastinals requiring stripping and milking frequently to maintain patency. No BS or passing gas. Insulin drip at 0.25-1 units/hr. K increasing to 7.6. CRRT started emergently last night at 2200.  Attempted to shift K with D50 and insulin and calcium chloride x2. ABGs becoming more metabolically acidodic.  Total of 3 amps NaHCO3 given over night.  2 unit(s) PRBCs, 2 units Platelets, 1 unit FFP and 500 ml Albumin given overnight. Increased mottling across whole body. Dr. Espinal updated on patient's progress throughout the night. Possible care conference today. Will continue current plan of care and update MDs with any changes.

## 2018-08-10 NOTE — PROVIDER NOTIFICATION
ECLS Discontinuation Note:    ECLS was discontinued 8/10/2018 at 13:27.    Alexey Luque, RRT  8/10/2018 1:33 PM

## 2018-08-10 NOTE — MR AVS SNAPSHOT
After Visit Summary   8/10/2018    Jay Moses    MRN: [id number]           Patient Information     Date Of Birth          1940        Visit Information        Provider Department      8/10/2018 7:00 AM [facility] EEG TECH 4 [facility] EEG        Today's Diagnoses     Cardiogenic shock (H)    -  1       Follow-ups after your visit        Your next 10 appointments already scheduled     Aug 17, 2018   Procedure with Chris Espinal MD   [facility], [place], Same Day Surgery (--)    [address]  [place] [address] [phone]              Who to contact     Please call your clinic at [phone] to:    Ask questions about your health    Make or cancel appointments    Discuss your medicines    Learn about your test results    Speak to your doctor            Additional Information About Your Visit        MyChart Information     [employer] is an electronic gateway that provides easy, online access to your medical records. With [employer], you can request a clinic appointment, read your test results, renew a prescription or communicate with your care team.     To sign up for [employer] visit the website at [email or web address]   You will be asked to enter the access code listed below, as well as some personal information. Please follow the directions to create your username and password.     Your access code is: [email or web address]  Expires: 2018  6:45 PM     Your access code will  in 90 days. If you need help or a new code, please contact your [facility] or call [phone] for assistance.        Care EveryWhere ID     This is your Care EveryWhere ID. This could be used by other organizations to access your [place] medical records  [id number]         Blood Pressure from Last 3 Encounters:   No data found for BP    Weight from Last 3 Encounters:   18 99.8 kg (220 lb 0.3 oz)              We Performed the Following     Glucose by meter          Today's Medication  Changes      Notice     This visit is during an admission. Changes to the med list made in this visit will be reflected in the After Visit Summary of the admission.             Primary Care Provider    None Specified       No primary provider on file.        Equal Access to Services     AMARI ZIMMERMAN : Ángel Ji, torymegan vazquezgatitoha, maeve beltran, juany delmyin hayaarobbi garzonkaileydonnie castle. So [facility] [phone].    ATENCIÓN: Si habla español, tiene a diamond disposición servicios gratuitos de asistencia lingüística. Llame al [phone].    We comply with applicable federal civil rights laws and [place] laws. We do not discriminate on the basis of race, color, national origin, age, disability, sex, sexual orientation, or gender identity.            Thank you!     Thank you for choosing [facility]  for your care. Our goal is always to provide you with excellent care. Hearing back from our patients is one way we can continue to improve our services. Please take a few minutes to complete the written survey that you may receive in the mail after your visit with us. Thank you!             Your Updated Medication List - Protect others around you: Learn how to safely use, store and throw away your medicines at www.disposemymeds.org.      Notice     This visit is during an admission. Changes to the med list made in this visit will be reflected in the After Visit Summary of the admission.

## 2018-08-10 NOTE — PROGRESS NOTES
CRRT INITIATION NOTE    Consent for CRRT Completed:  NO-Emergent  Patient s Vascular Access: ECMO                   Placement Confirmed: YES  Manufacture:  -  Model:  -  Length/Wolof Size:  -  Flush Volume:  -    DATA:  Procedure:  CVVHDF  Start Time:  2159  Machine#:  2  Filter:  M150  Blood Flow:  200  ML/min  Pre-Replacement Solution:  PrismaSol 2/3.5  Post-Replacement Solution:  PrismaSol 2/3.5  Dialysate Solution: PrismaSol 2/3.5   Pre-Replacement Solution Rate:  1100 mL/hr  Post-Replacement Solution Rate:  200 mL/hr  Dialysate Flow Rate:  1100 mL/hr   Patient Removal Rate:  0 mL/hr  Anticoagulation Type and Rate:  0    ASSESSMENT:  How Patient Tolerated Initiation: -  Vital Signs: HR 78, RR 16, BP 89/72, MAP 76  Initial Pressures: Not available to get numbers. In OR for another case when CRRT was started.      INTERVENTIONS:  Initiated per MD orders. Lines connected to ECMO circuit. Family could not be reached for consent and was started under emergent consent for K level or 6.9.      PLAN:  Continue to monitor circuit daily and change set q72 hours or PRN for clotting/clogging. Please call CRRT RN with any questions/problems.

## 2018-08-10 NOTE — PROGRESS NOTES
SPIRITUAL HEALTH SERVICES  SPIRITUAL ASSESSMENT Progress Note (Palliative Focus)  [facility] ([place] 4E    REFERRAL SOURCE: Palliative care follow up.    Visit with family of patient Jay Moses at bedside: wife Maite, children Marcie, Kathy, Jessica, John, and Salty, grandchildren, and great-grandchildren. Family requested prayer of blessing and thanks for Jay's life in the Religious tradition. They joined in prayer and in telling Jay that they loved him. Family mutually supportive, grieving together, requested my presence at bedside with them until Jay's death, which I provided as well as grief support and end of life ritual.    Wife Maite said they plan to use [facility]  [place], [place], tel. [phone].    Plan: I am available for spiritual support as needed.    Lucinda Bangura  Palliative   Pager [phone]  [facility] Inpatient Team Consult pager [phone] (M-F 8-4:30)  After-hours Answering Service [phone]

## 2018-08-10 NOTE — PROGRESS NOTES
SPIRITUAL HEALTH SERVICES  SPIRITUAL ASSESSMENT Progress Note  [facility] 4E   ON-CALL VISIT    REFERRAL SOURCE: Page from bedside RN    Met with pt's family for a reflective conversation, review of the care conference that had just occurred, and a discussion of memories with the pt. Per their request, we shared a prayer for peace and healing. Family would like a follow up visit this afternoon once the remainder of the family arrives and they transition to comfort cares.    PLAN: Notify unit  of care provided; follow up with afternoon on-call  regarding request for visit.    Dante Palacios   Intern  Pager [phone]

## 2018-08-10 NOTE — PROVIDER NOTIFICATION
Notified Dr. Knox (CVTS cross-cover MD) and Dr. Sam Huerta (renal fellow) of patient's increasing potassium levels. Potassium up to 7.5.  Patient on 2K CRRT baths initiated emergently at 2200.  Dr. Knox ordered D50 and Insulin potassium shift.  Per renal fellow, continue with current CRRT settings. Will continue to monitor closely.

## 2018-08-10 NOTE — DISCHARGE SUMMARY
"[facility]  Discharge summary  Cardiac ICU     Jay Moses MRN# [id number]   YOB: 1940 Age: 78 year old     Date of Admission:  8/8/2018  Date of Death::  8/10/2018  Admitting Physician:  Chris Espinal MD  Discharge Physician:  Chris Espinal MD  Primary Care Physician:        No primary care provider on file.          Admission Diagnoses:   Cardiopulmonary failure on VA ECMO  Ventricular fibrillation arrest Post CABG  Cardiogenic shock (H)  Open Chest  Heart Failure   Open Sternotomy             Discharge Diagnosis:   Cardiopulmonary failure on VA ECMO  Ventricular fibrillation arrest Post CABG  Cardiogenic shock (H)  Open Chest  Heart Failure   Open Sternotomy   Acute renal failure  Acute liver failure         Procedures:   -8/9/18 ~0600: Bedside Washout  -8/9/18 ~1800: Bedside washout         Consultations:   PALLIATIVE CARE ADULT IP CONSULT  WOUND OSTOMY CONTINENCE NURSE  IP CONSULT  PHARMACY TO DOSE VANCO  PHARMACY IP CONSULT  VASCULAR ACCESS CARE ADULT IP CONSULT  PHARMACY IP CONSULT  PHARMACY CRRT IP CONSULT  NEPHROLOGY ICU IP CONSULT  PHARMACY IP CONSULT             Brief History of Illness:   As per admission H&P \"79 y/o M with ASCVD and HFrEF s/p CAB x 4 today at [facility]'s c/b Vfib arrest in ICU. Chest was opened bedside without evidence of tamponade, DCCV obtained sinus rhythm briefly and bedside peripheral VA ECMO was initiated via left femoral vessels. He proceeded to the cath lab where a BMS was placed in the LAD distal to the LIMA to LAD anastomosis. He was transferred to [facility] on VA ECMO for further management. \"           Hospital Course:     The patient was transferred to [facility] Cardiac ICU on four pressors and was noted to have significant output from chest tubes attributable to receiving Integrilin after stent placement. He received product to correct anemia and coagulopathy. Despite this, he had worsening hemodynamics and continued " high output from chest tubes prompting bedside washout in the ICU with removal of large volume of blood and clot in pericaridum.  Decreased chest tube output afterwards.  Patient was started on heparin and cangrelor to prevent clotting in setting of recently placed bare metal stent. Subsequent chest x rays showed worsening of left hemothorax, prompting additional chest washout at bedside with removal of large clots in the early evening of . Worsening multi-organ failure overnight with rising lactic acidosis, coagulopathy 2/2 liver failure, persistent hyperkalemia despite CRRT, increasing ECMO support, s/p multiple bedside washouts for continued bleeding. Poor prognosis discussed with family. Opted to transition to comfort cares, remove ECMO support and extubated.         Day of Death Physical Exam:   Pulseless, apneic, bilateral nonresponsive dilated pupils, not responding to stimuli         Discharge Disposition:   Patient  during this admission      Condition at discharge:     Ashwin Tucker MD   Pager: [phone]    Pt  discussed with Dr. Espinal on 8/10/18

## 2018-08-10 NOTE — PROGRESS NOTES
Short note till official consult order.    We will start CRRT for hyperkalemia and unuric SANJAY. Start with K2 bag, plan for UF 0 to (- 50) CC per hour as tolerated to keep MAP >55 mmHg   Also patient has hypernatremia, to start with 3% NaCl at rate 25 ml/hr if Na level drops less than 145 and to titrate it up to keep Na level between 145 and 150      Jazlyn Huerta M.D.  Nephrology Fellow  Pager: [phone]

## 2018-08-10 NOTE — CONSULTS
Nephrology Initial Consult  August 9, 2018      Jay Moses MRN:[id number] YOB: 1940  Date of Admission:8/8/2018  Primary care provider: No primary care provider on file.  Requesting physician: Stef Herrera, *    ASSESSMENT AND RECOMMENDATIONS:   Jay Moses is a 78 year old male s/p CABGx4 at OSH on 8/8 complicated by VF Arrest post operatively, with repeat cath showing 50% lesion distal to LIMA anastamosis with placement of PCI to distal LIMA. Patient was placed on [facility] ECMO. Transferred to [facility] on ECMO and 4 pressors. After arrival, was defibrillatedx1 for VF arrest. Overnight after arrival was also noted to have bleeding and bedside washout and 1500 ml was removed.     1- Anuric SANJAY on normal baseline kidney function:  - Crea baseline 0.7, today 1.77  - Etiology due to cardiac shock, S/P arrest, reopen his chest at bedside twice and hemorrhagic shock  - UOP for the past 24 hours 1400 with Lasix 40 mg IV but UOP dropped to 5 ml/hr for the past few hours.   - To start with CRRT, UF 0 to (-50) ml/hr as tolerated with MAP >55 mmHg      2- Blood pressure-Volume status:  - Patient was on 4 pressors during the day, at the time when I encounter him, was on 3 pressors (Epinephrine, Norepinephrine and Vasopressin)  - Net I/O since +5700  - Patient has lower extrs edema +1 but no rales or crackles by physical exam       3- Hyperkalemia:  - K level elevated dramatically after episodes of hypokalemia treated with KCl total of 60 mEq  - Patient received about 13 units of PRBC  - K level elevated from 5.9 to 6.5 in few hours  - Patient received D50 and Insulin.     4- Hypernatremia:  - Na level 155, on admission 148  - To start CRRT, if Na level drops less than 145 then stat 3% NaCl at rate 25 ml/hr and up titrate to keep Na level 145 to 150    Recommendations were communicated to primary team via verbal communications    Seen and discussed with Dr. Poncho Huerta MD    [phone]      REASON FOR CONSULT: Anuric SANJAY, Hyperkalemia, hypernatremia    HISTORY OF PRESENT ILLNESS:  Jay Moses is a 78 year old male who underwent CABG X4 at OSH on 8/8 complicated by VF Arrest post operatively, with repeat cath showing 50% lesion distal to LIMA anastamosis with placement of PCI to distal LIMA. Patient was placed on VA ECMO. Transferred to [facility] on ECMO and 4 pressors. After arrival, was defibrillatedx1 for VF arrest. Overnight after arrival was also noted to have bleeding and bedside and washout was done and 1500 ml was removed. Baseline Patient's renal function was within normal range before the admission but during the hospitalization and due to hemorrhagic shock and bleeding shock and requirements of 4 pressors patient develops anuric acute kidney injury complicated by hyperkalemia and hypernatremia.          PAST MEDICAL HISTORY:  Reviewed with patient on 08/09/2018   Unable, patient is sedated, intubated.    No past medical history on file.    No past surgical history on file.     MEDICATIONS:  PTA Meds  Prior to Admission medications    Not on File      Current Meds    artificial tears   Both Eyes Q8H     cangrelor  30 mcg/kg (Dosing Weight) Intravenous Once     EPINEPHrine PF         pantoprazole (PROTONIX) IV  40 mg Intravenous Daily     piperacillin-tazobactam  3.375 g Intravenous Q6H     sodium chloride (PF)  3 mL Intracatheter Q8H     vancomycin (VANCOCIN) IV  2,500 mg (central catheter) Intravenous Q18H     Infusion Meds    cangrelor (KENGREAL) infusion ADULT Stopped (08/09/18 1800)     cisatracurium (NIMBEX) infusion ADULT 2 mcg/kg/min (08/09/18 2100)     IV fluid REPLACEMENT ONLY       dextrose 75 mL/hr at 08/09/18 2100     dialysate for CVVHD & CVVHDF (PrismaSol BGK 2/3.5) 12.5 mL/kg/hr (08/09/18 2113)     DOPamine Stopped (08/08/18 2353)     EPINEPHrine IV infusion ADULT 0.01 mcg/kg/min (08/09/18 2100)     fentaNYL 100 mcg/hr (08/09/18 2100)     heparin 2 unit/mL in 0.9%  "NaCl 3 mL/hr (18 1900)     HEParin 400 Units/hr (18)     insulin (regular) Stopped (18)     lidocaine 1 mg/min (18)     - MEDICATION INSTRUCTIONS -       midazolam 6 mg/hr (18)     - MEDICATION INSTRUCTIONS -       norepinephrine 0.05 mcg/kg/min (18)     phenylephrine IV infusion ADULT Stopped (18)     replacement solution for CVVHD & CVVHDF (PrismaSol BGK 2/3.5) 200 mL/hr at 18     replacement solution for CVVHD & CVVHDF (PrismaSol BGK 2/3.5) 12.5 mL/kg/hr (18)     vasopressin (PITRESSIN) infusion ADULT (40 mL) 4 Units/hr (18)       ALLERGIES:    No Known Allergies    REVIEW OF SYSTEMS:  A comprehensive of systems was negative except as noted above.    SOCIAL HISTORY:   Social History     Social History     Marital status: N/A     Spouse name: N/A     Number of children: N/A     Years of education: N/A     Occupational History     Not on file.     Social History Main Topics     Smoking status: Not on file     Smokeless tobacco: Not on file     Alcohol use Not on file     Drug use: Not on file     Sexual activity: Not on file     Other Topics Concern     Not on file     Social History Narrative     Reviewed with patient   Unable, patient is sedated, intubated    No one accompanies Jay Moses in hospital room    FAMILY MEDICAL HISTORY:   No family history on file.  Reviewed with patient: unable, patient is sedated, intubated.    PHYSICAL EXAM:   Temp  Av.2  F (36.2  C)  Min: 94.8  F (34.9  C)  Max: 98.1  F (36.7  C)  Arterial Line MAP (mmHg)  Av.5 mmHg  Min: 56 mmHg  Max: 103 mmHg  Arterial Line BP  Min: 61/51  Max: 143/65 Arterial Line Location 2: Right radial    No Data Recorded Resp  Av.4  Min: 16  Max: 18  SpO2  Av.6 %  Min: 86 %  Max: 100 %  FiO2 (%)  Av.5 %  Min: 50 %  Max: 100 %    CVP (mmHg): 18 mmHg  Temp 97.7  F (36.5  C)  Resp 16  Ht 1.803 m (5' 11\")  Wt 91.8 kg (202 lb " 6.1 oz)  SpO2 95%  BMI 28.23 kg/m2   Date 08/09/18 0700 - 08/10/18 0659   Shift 9914-0833 8854-1431 5385-9039 24 Hour Total   I  N  T  A  K  E   I.V. 1596.46 795.48  2391.94    NG/GT 30 60  90    Colloid 500   500    Blood Components 2861 300  3161    Shift Total  (mL/kg) 4987.46  (54.33) 1155.48  (12.59)  6142.94  (66.92)   O  U  T  P  U  T   Urine 220 140  360    Chest Tube  (mL/kg) 1080  (11.76) 760  (8.28)  1840  (20.04)    Shift Total  (mL/kg) 1300  (14.16) 900  (9.8)  2200  (23.97)   Weight (kg) 91.8 91.8 91.8 91.8        Admit Weight: 91.8 kg (202 lb 6.1 oz) (per OSH)     GENERAL APPEARANCE: Patient is sedated, intubated  EYES: scleral icterus, pupils equal  Endo:  goiter,   Lymphatics: no cervical lymph nodes was palpated  Pulmonary: lungs clear to auscultation bilaterally,   CV: paced rhythm, chest incision still open S/P CABG, covered by vacuum assisted closure system   - JVD not able to evaluate as patient in supine position.   - Edema +1 lower extremities  GI: soft, nontender, normal bowel sounds  MS: no evidence of inflammation in joints, no muscle tenderness  :  Tee in place with minimum light yellow color  SKIN: no rash, warm, dry, no cyanosis  NEURO: sedated, intubated  LABS:   CMP  Recent Labs  Lab 08/09/18  2050 08/09/18  2030 08/09/18  1751 08/09/18  1553 08/09/18  1210 08/09/18  1003   *  --   --  155* 158* 157*   POTASSIUM 6.9*  --  6.5* 5.9* 4.2 3.4   CHLORIDE 119*  --   --  118* 120* 120*   CO2 27  --   --  28 31 30   ANIONGAP 8  --   --  9 7 7   * 149*  --  109*  112* 86 202*  213*   BUN 22  --   --  20 17 16   CR 2.15*  --   --  1.77* 1.41* 1.23   GFRESTIMATED 30*  --   --  37* 48* 57*   GFRESTBLACK 36*  --   --  45* 59* 69   THADDEUS 7.6*  --   --  7.9* 8.0* 7.8*   MAG 2.1  --   --  2.1 2.2 2.2   PHOS 7.7*  --   --  5.7* 3.8 1.9*   PROTTOTAL 3.5*  --   --  4.0* 4.1* 4.0*   ALBUMIN 2.1*  --   --  2.6* 2.7* 2.7*   BILITOTAL 2.1*  --   --  2.2* 1.8* 1.8*   ALKPHOS 27*  --   --   29* 29* 29*   *  --   --  38 25 21   *  --   --  18 14 16     CBC  Recent Labs  Lab 08/09/18 2050 08/09/18  1553 08/09/18  1210 08/09/18  1003   HGB 7.7* 8.1* 7.5* 8.6*   WBC 8.4 7.8 6.5 6.8   RBC 2.63* 2.77* 2.55* 2.97*   HCT 22.4* 23.2* 21.3* 25.0*   MCV 85 84 84 84   MCH 29.3 29.2 29.4 29.0   MCHC 34.4 34.9 35.2 34.4   RDW 15.5* 15.1* 15.0 14.8   PLT 71* 102* 106* 58*     INR  Recent Labs  Lab 08/09/18 2050 08/09/18  1553 08/09/18  1210 08/09/18  1003   INR 2.17* 1.72* 1.74* 1.79*   * 88* 54* 54*     ABG  Recent Labs  Lab 08/09/18 2030 08/09/18  1751 08/09/18  1553 08/09/18  1508 08/09/18  1421   PH 7.41 7.43 7.49*  --  7.44   PCO2 42 42 40  --  47*   PO2 184* 215* 218*  --  109*   HCO3 26 28 30*  --  32*   O2PER 50.0 50.0 50.0  50.0 50.0 50      URINE STUDIES  Recent Labs   Lab Test  08/09/18   0254   COLOR  Light Yellow   APPEARANCE  Clear   URINEGLC  >1000*   URINEBILI  Negative   URINEKETONE  40*   SG  1.019   UBLD  Negative   URINEPH  5.0   PROTEIN  Negative   NITRITE  Negative   LEUKEST  Negative   RBCU  0   WBCU  1     No lab results found.  PTH  No lab results found.  IRON STUDIES  No lab results found.    IMAGING:  All imaging studies reviewed by me.     Jazlyn Huerta MD   Nephrology Fellow  Pager: [phone]

## 2018-08-10 NOTE — PHARMACY-VANCOMYCIN DOSING SERVICE
Pharmacy Vancomycin Note  Date of Service 2018  Patient's  1940   78 year old, male    Indication: Open Sternotomy  Goal Trough Level: 15-20 mg/L  Day of Therapy: 2  Current Vancomycin regimen: intermittent dosing    Current estimated CrCl = initiating CRRT this pm    Creatinine for last 3 days  2018: 10:29 PM Creatinine 0.80 mg/dL  2018:  2:32 AM Creatinine 0.92 mg/dL;  6:45 AM Creatinine 0.90 mg/dL; 10:03 AM Creatinine 1.23 mg/dL; 12:10 PM Creatinine 1.41 mg/dL;  3:53 PM Creatinine 1.77 mg/dL    Recent Vancomycin Levels (past 3 days)  2018:  8:04 PM Vancomycin Level 11.5 mg/L    Vancomycin IV Administrations (past 72 hours)                   vancomycin (VANCOCIN) 2,250 mg in sodium chloride 0.9 % 250 mL intermittent infusion (mg) 2,250 mg New Bag 18 0303                Nephrotoxins and other renal medications (Future)    Start     Dose/Rate Route Frequency Ordered Stop    18 2130  vancomycin (VANCOCIN) 2,500 mg in sodium chloride 0.9 % 250 mL intermittent infusion      2,500 mg (central catheter)  over 60 Minutes Intravenous EVERY 18 HOURS 18 0000  piperacillin-tazobactam (ZOSYN) 3.375 g vial to attach to  mL bag      3.375 g  over 30 Minutes Intravenous EVERY 6 HOURS 18 224  norepinephrine (LEVOPHED) 16 mg in D5W 250 mL infusion      0.03-0.4 mcg/kg/min × 91.8 kg (Dosing Weight)  2.6-34.4 mL/hr  Intravenous CONTINUOUS 18  vasopressin (VASOSTRICT) 40 Units in D5W 40 mL infusion      0.5-4 Units/hr  0.5-4 mL/hr  Intravenous CONTINUOUS 18  phenylephrine (TAMMIE-SYNEPHRINE) 50 mg in sodium chloride 0.9 % 250 mL infusion      0.5-6 mcg/kg/min × 91.8 kg (Dosing Weight)  13.8-165.2 mL/hr  Intravenous CONTINUOUS 18               Contrast Orders - past 72 hours     None          Interpretation of levels and current regimen:  Trough level is  Subtherapeutic  11.2 and represents a 17 hour level    Has serum creatinine changed > 50% in last 72 hours: Yes    Urine output:  diminished urine output    Renal Function: starting CRRT this evening    Plan:  1.  Schedule Vancomycin 2500 mg iv q18h with the initiation of CRRT this evening  2.  Pharmacy will check trough levels as appropriate in 1-3 Days.    3. Serum creatinine levels will be ordered daily for the first week of therapy and at least twice weekly for subsequent weeks.      Genny Escobar, PharmD        .

## 2018-08-10 NOTE — PHARMACY-CONSULT NOTE
Pharmacy Consult- Hyperkalemia    Patient is being treated for hyperkalemia K = 6.5. A pharmacy consult was initiated to review the patient's medication list for possible causes of hyperkalemia.    Mr Moses received a total of 142 meQ of K+ today:  20 meq x 6 = 120 meq + 22 meq K+ from the 20 mmol of KPhos he received    Genny Escobar, RodolfoD

## 2018-08-10 NOTE — PROGRESS NOTES
ECMO Shift Summary:    Patient remains on VA ECMO, all equipments are functioning and alarms are appropriately set. RPM's 3800 with flow range 4.0 L/min. Sweep gas is at 5 LPM and FiO2 70%. Circuit remains free of air, clot and fibrin. Cannulas are secure with no bleeding from site. Extremities are warm to the touch. Suctioned ETT for small amount of secretions.    Significant Shift Events:    Vent settings:  Ventilation Mode: CMV/AC  (Continuous Mandatory Ventilation/ Assist Control)  FiO2 (%): 50 %  Rate Set (breaths/minute): 16 breaths/min  Tidal Volume Set (mL): 450 mL  PEEP (cm H2O): 7 cmH2O  Oxygen Concentration (%): 50 %  Resp: 16.    Heparin is running at 400 u/kg/hr, ACT range 172.    Urine output is 50-80 ml per hour, no blood loss from cannula sites, but significant chest tubes output, patient had a chest washout and dressing change on bedside by Dr. Espianl. Products given included 1 unit of PRBC, 1 unit of platelets.      Intake/Output Summary (Last 24 hours) at 08/09/18 2238  Last data filed at 08/09/18 2215   Gross per 24 hour   Intake          42626.2 ml   Output             6870 ml   Net           4308.2 ml       ECHO:  No results found for this or any previous visit.No results found for this or any previous visit.    CXR:  Recent Results (from the past 24 hour(s))   XR Chest Port 1 View    Narrative    Exam:  XR CHEST PORT 1 VW, 8/9/2018 12:28 AM    History: Check endotracheal tube placement and ECLS cannula placement.  DO NOT log-roll patient.  Place film under patient using 6 MAN LIFT;     Comparison:  None available.    Findings:  Single AP view of the chest. Endotracheal tube tip projects  approximately 4.6 cm above the josie. Right IJ [place]-Kalie catheter tip  projects over the main pulmonary artery. Inferior approach ECMO  cannula tip projects over the mid right atrium. Mediastinal drains and  left chest tube is noted. Cardiac silhouette is within normal limits.  Low lung volumes. Small left  pleural effusion and adjacent basilar  opacities. No definite pneumothorax. Mild pulmonary vascular  congestion. Visualized upper abdomen is unremarkable.      Impression    Impression:    1. Endotracheal tube tip projects over the midthoracic trachea. ECMO  cannula tip projects over the mid right atrium. Additional support  devices as above.  2. Small left pleural effusion with adjacent basilar atelectasis  and/or consolidation.  3. Low lung volumes with mild pulmonary vascular congestion.    I have personally reviewed the examination and initial interpretation  and I agree with the findings.    MANUEL FELDMAN MD   XR Chest Port 1 View    Narrative    Exam:  Chest X-ray 8/9/2018 7:59 AM    History: Check endotracheal tube placement and ECLS cannula placement.  DO NOT log-roll patient.  Place film under patient using 6 MAN LIFT;     Comparison: 8/9/2018    Findings: Portable chest radiograph. Interval placement of right chest  tube. Left chest tube has advanced, tip now projecting near the left  lung base. Mediastinal drains. ECMO catheter with tip terminating at  the mid left atrium. Endotracheal tube in stable position. [place]-Kalie  catheter with tip in the main pulmonary artery. Enteric tube  projecting over the esophagus, tip not visualized. Esophageal  temperature probe. Cardiac size is stable. Congested pulmonary  vasculature. Left pleural effusion with overlying opacities. No  definite pneumothorax.       Impression    Impression:  1. Interval placement of right chest tube and advancement of the left  chest tube.  2. Inferior approach ECMO catheter tip in the mid right atrium.  3. Left pleural effusion.  4. Hilar fullness with diffuse linear airspace opacities, likely  pulmonary edema.  5. Endotracheal tube in stable position    I have personally reviewed the examination and initial interpretation  and I agree with the findings.    AC PALACIOS MD   XR Chest Port 1 View    Narrative    Exam:  Chest X-ray  8/9/2018 1:56 PM    History: 78M s/p CAB x4 c/b Vfib arrest s/p washout x 2 and VA ECMO  cannulation. Assess positioning of PA catheter;     Comparison: Same day chest radiograph    Findings: Single portable chest radiograph. Endotracheal tube  projecting over the mid thoracic trachea. Right [place]-Kalie catheter  with tip terminating in the proximal main pulmonary trunk, minimally  advanced compared to prior. Bilateral chest tubes in stable position.  Dual mediastinal drains. Inferior projectile catheter terminating in  the low right atrium. Enteric tube in stable position. Cardiac  silhouette size is stable. Congestive pulmonary vasculature. Fluffy  airspace opacity overlying the left apex. Left pleural effusion. No  pneumothorax.      Impression    Impression:  1. Right [place]-Kalie catheter appears to be slightly advanced, still  within the proximal main pulmonary artery.  2. New left apical opacity, may represent developing loculated  hematoma. Recommend follow-up radiograph.  3. Left pleural effusion with bilateral perihilar fullness, suggestive  of pulmonary edema.  4. Additional support devices in stable position.     [Result: Left apical opacity]    Finding was identified on 8/9/2018 3:39 PM.     Dr. Herrera was contacted by Dr. Adorno at 8/9/2018 3:39 PM and  verbalized understanding of the urgent finding.     I have personally reviewed the examination and initial interpretation  and I agree with the findings.    AC PALACIOS MD   XR Fluoro Port Time 0/1 Hour    Narrative    This exam was marked as non-reportable because it will not be read by a   radiologist or a [place] non-radiologist provider.             XR Chest Port 1 View    Narrative    XR CHEST PORT 1 VW  8/9/2018 3:19 PM      HISTORY: SWAN Placement;     COMPARISON: Radiograph earlier same day    FINDINGS: Right IJ [place]-Kalie catheter is slightly advanced anteriorly  positioned, tip projecting over the central right pulmonary artery.  Postsurgical  changes in the chest. ET tube in proper position.  Esophageal temperature probe is in the mid esophagus. Bilateral chest  tubes are noted. ECMO cannula projecting over the right atrium. Stable  cardiac silhouette. Bilateral perihilar airspace opacities, slightly  increased. There is increased rounded opacities projecting over the  left upper chest in the superior aspect of the left pleural space.      Impression    IMPRESSION:   1. Rapidly accumulating hematoma in the superior aspect of the left  pleural space.  2. Interval repositioning of right [place]-Kalie catheter, tip projecting  over the right pulmonary artery.  3. Slightly increased bilateral perihilar opacities, likely pulmonary  edema and/or atelectasis.    [Result: Rapidly accumulating hematoma in the left apical pleural  space.]    Finding was identified on 8/9/2018 3:20 PM.     Dr. Fletcher was contacted by Dr. Walters at 8/9/2018 4:22 PM and  verbalized understanding of the urgent finding.     I have personally reviewed the examination and initial interpretation  and I agree with the findings.    AC PALACIOS MD   US Lower Extremity Arterial Duplex Bilateral Port    Narrative    Exam: Duplex ultrasound of bilateral lower extremity arteries dated  8/9/2018 3:31 PM     Clinical information: Baseline to assess blood flow to Lower  Extremities (VA ECMO);      Comparison: None available    Technique: Includes Gray Scale images, color Doppler, spectral Doppler  waveforms and velocities with appropriate angles of 60 degrees or  less.    Ordering provider: Irvin Coppola    Findings:     Right lower extremity:     Abnormal lower extremity waveforms, monophasic arterial prominence,  likely secondary to ECMO.    Common femoral artery: 35 cm/sec.   Deep femoral artery: 36 cm/sec.   Proximal SFA: 16 cm/sec.   Mid SFA: 29 cm/sec.   Distal SFA: 18 cm/sec.   Distal SFA into collateral: 62 cm/sec  Distal SFA, distal to collateral: No flow, likely chronically  occluded  Popliteal artery proximal/mid: No flow likely chronically occluded  Popliteal artery distal: 4 cm/sec.   PTA ankle: 19 cm/sec.   EUNICE ankle: No flow.    Left lower extremity:    Bandage obscuring the common femoral artery and deep femoral artery.  Proximal SFA: 50 cm/sec.   Mid SFA: 37 cm/sec.   Distal SFA: 38 cm/sec.   Popliteal artery: 15 cm/sec.   PTA ankle: 29 cm/sec.   EUNICE ankle: 20 cm/sec.       Impression    Impression:     1. 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 There is a absence of flow in the  PTA..  There are abnormal waveforms likely secondary to ECMO.  2. Left leg: Maintained vascular flow lower extremity vasculature  including the 18th PTA..  There are abnormal waveforms, likely  secondary to    Guidelines:  [facility] duplex criteria for lower limb arterial  occlusive disease  -Percent stenosis- Normal (1-19%): Peak systolic velocity (cm/s):  <150, End-diastolic velocity (cm/s): <40, Velocity ration (Vr): <1.5,  Distal arterial waveform: Triphasic  -Percent stenosis- 20-49%: Peak systolic velocity (cm/s): 150-200,  End-diastolic velocity (cm/s): <40, Velocity ration (Vr): 1.5-2.0,  Distal arterial waveform: Triphasic  -Percent stenosis- 50-75%: Peak systolic velocity (cm/s): 200-300,  End-diastolic velocity (cm/s): <90, Velocity ration (Vr): 2.0-3.9,  Distal arterial waveform: Poststenotic turbulence distal to stenosis,  monophasic distal waveform  -Percent stenosis- >75%: Peak systolic velocity (cm/s): >300,  End-diastolic velocity (cm/s): <90, Velocity ration (Vr): >4.0, Distal  arterial waveform: Dampened distal waveform and low PSV/EDV* in the  stenosis  -Percent stenosis- Occlusion: Absent flow by color Doppler/pulsed  Doppler spectral analysis; length of occlusion estimated from distance  between exit and reentry collateral  arteries  *PSV = peak systolic velocity, EDV = end-diastolic velocity  http://link.hyde.com/chapter/10.1007/[email or web address]_23/fulltext  html       Labs:    Recent Labs  Lab 08/09/18  2030 08/09/18  1751 08/09/18  1553 08/09/18  1508 08/09/18  1421   PH 7.41 7.43 7.49*  --  7.44   PCO2 42 42 40  --  47*   PO2 184* 215* 218*  --  109*   HCO3 26 28 30*  --  32*   O2PER 50.0 50.0 50.0  50.0 50.0 50       Lab Results   Component Value Date    HGB 7.7 (L) 08/09/2018    PHGB <30 08/09/2018    PLT 71 (L) 08/09/2018    FIBR 171 (L) 08/09/2018    INR 2.17 (H) 08/09/2018     (H) 08/09/2018    DD 13.0 (H) 08/09/2018    AXA <0.10 08/09/2018    ANTCH 34 (L) 08/09/2018         Plan is to continue managing patient on ECMO.      Isac Jiang, RRT  8/9/2018 10:38 PM

## 2018-08-10 NOTE — PROGRESS NOTES
CRRT DISCONTINUATION NOTE    DATA:  Time:  1:32 PM    INTERVENTIONS:   Stopped therapy per MD request.    PLAN:  Patient transitioned to comfort cares. Family at bedside. Plan to withdraw care.

## 2018-08-10 NOTE — PROGRESS NOTES
INITIAL REPORT of Video-EEG Monitoring              DATE OF RECORDIN2018         I reviewed the first 1 hour of video-EEG monitoring of Jay Moses.          The EEG during sedated coma was abnormal due to continuous, low-amplitude, generalized delta slowing with intermixed low-amplitude theta and alpha activities.    No electrographic seizures and no interictal epileptiform abnormalities were observed.         These abnormalities indicate severe electrographic encephalopathy.  This recording excludes non-convulsive status epilepticus as a possible cause of this encephalopathy.    Screening for intermittent seizures will require a longer period of recording.   Boy Chau M.D., [facility] [phone]

## 2018-08-10 NOTE — PROGRESS NOTES
Short Note:    I called the son Mr. Lopez at [phone] about 5-6 times over 90 mins to get consent to start CRRT. Unfortunately he did not answer, I did not find any other number to call. Repeated K was 6.9 from 6.5 so for patient care and best of interest to my patient, decision to start CRRT emergently as hyperkalemia is life threatening.     To follow up and to continue trying reaching Mr. Lopez    Jazlyn Huerta M.D.   Nephrology Fellow  Pager: [phone]

## 2018-08-10 NOTE — PROGRESS NOTES
Worsening multi-organ failure overnight with rising lactic acidosis, coagulopathy 2/2 liver failure, persistent hyperkalemia despite CRRT, increasing ECMO support, s/p multiple bedside washouts for continued bleeding. Poor prognosis discussed with family. Opted to transition to comfort cares, remove ECMO support and extubated. Monitors without evidence of cardiac activity.     On exam, pulseless, no respiratory effort nor chest rise, pupils dilated bilaterally and nonreactive, no response to stimuli.    Patient pronounced  at 1330 on 8/10/18.    Ashwin Tucker MD   PGY-3, GEN SURG

## 2018-08-10 NOTE — PROGRESS NOTES
Pt continues on ECMO this morning with lactic acid continuing to increase. Potassium increased with CRRT running. Dr. Espinal met with family around 1000 to update on patient status. Decision was made to make patient comfortable until rest of family arrived then will stop support.  was called and with patient after care conference and during withdrawal of support. Time of death was 1330. Family at bedside and supported during the process. Belonging sent with patient to the [facility].

## 2018-08-10 NOTE — PROGRESS NOTES
Patient suctioned and electively extubated per physician order. Placed on room air, per family request.    Alexey Luque, RT  8/10/2018 1:34 PM

## 2018-08-10 NOTE — PROGRESS NOTES
[facility] Cardiology Progress Note           Assessment and Plan:     78 year old male with s/p CABG complicated by post-operative cardiac arrest requiring ECMO placement.  Following ECMO placement underwent coronary angiogram and underwent stenting of the distal LAD after the LIMA.  Has had recurrent VF arrest and significant bleeding post-procedure.    Neuro  - Versed, fentanyl and cisatracurium  - Chest open so RAAS -4-5    Pulmonary  - Intubated, sedated, paralyzed  - No wean given critical illness at this time, open chest  - PPI ppx    Cardiovascular  - Can pull swan if it remains non-functional  - Q4-6h hemoglobin checks  - Recommend to start P2Y12 once stable from surgical standpoint.  Recommend DAPT given bare-metal stent placement as soon as possible  - VA ECMO  - ECMO labs and transfusions as needed  - Consider NADIA today pending clinical course  - Vancomycin and Zosyn x5 days, further doses pending clinical course  - VF on lidocaine, eventually transition to amiodarone    GI/Nutrition  - Tube feeds pending decisions today    ID  - Cultures pending  - Post-ECMO Vanc/Zosyn ordered    Renal  - Cr rising  - K very elevated despite CRRT    Lactic acid is rising, K very elevated despite CRRT, remains on pressors.  Concerning for ischemia (bowel?).  Given age and critical illness, agree with planned family meeting today to discuss goals.    Octavio Wilks MD  Cardiology Fellow        Interval History:     Washed out yesterday afternoon/early evening again for bleeding.  K remains elevated on CRRT, lactate is rising (=18).          Medications:       artificial tears   Both Eyes Q8H     cangrelor  30 mcg/kg (Dosing Weight) Intravenous Once     pantoprazole (PROTONIX) IV  40 mg Intravenous Daily     piperacillin-tazobactam  3.375 g Intravenous Q6H     sodium bicarbonate  100 mEq Intravenous Once     sodium chloride (PF)  3 mL Intracatheter Q8H     vancomycin (VANCOCIN) IV  2,500 mg  (central catheter) Intravenous Q18H             Physical Exam:   Temp:  [94.8  F (34.9  C)-99  F (37.2  C)] 98.8  F (37.1  C)  Heart Rate:  [] 79  Resp:  [16] 16  MAP:  [59 mmHg-83 mmHg] 77 mmHg  Arterial Line BP: ()/(57-77) 93/73  FiO2 (%):  [50 %] 50 %  SpO2:  [86 %-100 %] 92 %    Intake/Output Summary (Last 24 hours) at 08/09/18 0653  Last data filed at 08/09/18 0600   Gross per 24 hour   Intake          4604.06 ml   Output             4430 ml   Net           174.06 ml       Intubated  Sedated  Pupils minimally reactive  Difficult to hear heart sounds  Mechanical breath sounds bilaterally  Chest tubes with bloody output  Extremities are edematous, 1+, VA ECMO on left  Dressing over central chest, chest open  Skin is mottled over chest            Data:   ROUTINE IP LABS (Last four results)  BMP  Recent Labs  Lab 08/10/18  0604 08/10/18  0411 08/10/18  0205 08/10/18  0033 08/09/18  2050 08/09/18  2030  08/09/18  1553   NA  --  149*  --  150* 155*  --   --  155*   POTASSIUM 7.6* 7.5* 7.5*  7.1* 7.4* 6.9*  --   < > 5.9*   CHLORIDE  --  115*  --  114* 119*  --   --  118*   THADDEUS  --  7.5*  --  7.8* 7.6*  --   --  7.9*   CO2  --  18*  --  22 27  --   --  28   BUN  --  20  --  21 22  --   --  20   CR  --  1.89*  --  1.96* 2.15*  --   --  1.77*   GLC  --  112*  119*  --  119* 141* 149*  --  109*  112*   < > = values in this interval not displayed.  CBC    Recent Labs  Lab 08/10/18  0814 08/10/18  0411 08/10/18  0033 08/09/18 2050   WBC 8.3 9.4 8.7 8.4   RBC 2.50* 2.90* 2.79* 2.63*   HGB 7.4* 8.6* 8.2* 7.7*   HCT 22.2* 25.8* 24.2* 22.4*   MCV 89 89 87 85   MCH 29.6 29.7 29.4 29.3   MCHC 33.3 33.3 33.9 34.4   RDW 16.7* 16.6* 15.9* 15.5*   PLT 81* 104* 89* 71*     INR    Recent Labs  Lab 08/10/18  0814 08/10/18  0411 08/10/18  0033 08/09/18 2050   INR 3.62* 2.54* 2.06* 2.17*

## 2018-08-11 LAB
BACTERIA SPEC CULT: NORMAL
BLD PROD TYP BPU: NORMAL
BLD UNIT ID BPU: 0
BLOOD PRODUCT CODE: NORMAL
BPU ID: NORMAL
SPECIMEN SOURCE: NORMAL
TRANSFUSION STATUS PATIENT QL: NORMAL
TRANSFUSION STATUS PATIENT QL: NORMAL

## 2018-08-11 NOTE — PROCEDURES
[facility] EEG #-2 (Day 2 of Video-EEG Monitoring)    DATE OF RECORDIN/10/2018    DURATION OF RECORDIN hours, 2 minutes         CLINICAL SUMMARY:  This video-EEG monitoring procedure was performed in evaluation of encephalopathy in Kirill Nguyen. He was reported to be receiving fentanyl and midazolam by continuous infusion during the period of recording.      TECHNICAL SUMMARY:  This continuous EEG monitoring procedure was performed with 23 scalp electrodes in 10-20 system placements, and additional scalp, precordial and other surface electrodes used for electrical referencing and artifact detection.  Video monitoring was utilized and periodically reviewed by EEG technologists and the physician for electroclinical correlation.     EEG ACTIVITIES DURING COMA:  During ongoing sedated coma, there was continuous low amplitude generalized 0.5-4 Hz delta slowing, with intermixed low amplitude 4-10 Hz theta-alpha activities.  No interictal epileptiform abnormalities were observed.   No electrographic seizures were recorded during the period of monitoring.      SUMMARY OF DAY 2 OF VIDEO-EEG MONITORING:    The EEG recording during sedated coma was severely abnormal due to continuous low amplitude generalized delta slowing.    No interictal epileptiform abnormalities and no electrographic seizures were recorded.     SUMMARY OF 2 DAYS OF VIDEO-EEG MONITORING:    The EEG recording during ongoing sedated coma was  abnormal due to continuous low amplitude generalized delta slowing.    No interictal epileptiform abnormalities and no electrographic seizures were recorded.    These findings indicate severe electrographic encephalopathy.  Clinical correlation is recommended.   Boy Chau M.D., Professor of Neurology       D: 08/10/2018   T: 08/10/2018   MT:       Name:     KIRILL NGUYEN   MRN:      [id number]        Account:        [id number]   :      1940           Procedure Date: 08/10/2018       Document: [id number]

## 2018-08-11 NOTE — PROCEDURES
[facility] EEG #-1 (Day 1 of Video-EEG Monitoring)    DATE OF RECORDIN2018    DURATION OF RECORDIN hours, 7 minutes        CLINICAL SUMMARY:  This diagnostic video-EEG monitoring procedure was performed in evaluation of encephalopathy in Kirill Nguyen. He was reported to be receiving fentanyl and midazolam by continuous infusion during the period of recording.      TECHNICAL SUMMARY:  This continuous EEG monitoring procedure was performed with 23 scalp electrodes in 10-20 system placements, and additional scalp, precordial and other surface electrodes used for electrical referencing and artifact detection.  Video monitoring was utilized and periodically reviewed by EEG technologists and the physician for electroclinical correlation.     EEG ACTIVITIES DURING COMA:  During ongoing sedated coma, there was continuous low amplitude generalized 0.5-4 Hz delta slowing, with intermixed low amplitude 4-10 Hz theta-alpha activities.  No interictal epileptiform abnormalities were observed.   No electrographic seizures were recorded during the period of monitoring.      SUMMARY OF DAY 1 OF VIDEO-EEG MONITORING:    The EEG recording during sedated coma was severely abnormal due to continuous low amplitude generalized delta slowing.    No interictal epileptiform abnormalities and no electrographic seizures were recorded.   Boy Chau M.D., Professor of Neurology       D: 08/10/2018   T: 08/10/2018   MT:       Name:     KIRILL NGUYEN   MRN:      [id number]        Account:        [id number]   :      1940           Procedure Date: 2018      Document: [id number]

## 2018-08-12 LAB
BACTERIA SPEC CULT: ABNORMAL
BACTERIA SPEC CULT: ABNORMAL
BLD PROD TYP BPU: NORMAL
BLD PROD TYP BPU: NORMAL
BLD UNIT ID BPU: 0
BLD UNIT ID BPU: 0
BLOOD PRODUCT CODE: NORMAL
BLOOD PRODUCT CODE: NORMAL
BPU ID: NORMAL
BPU ID: NORMAL
SPECIMEN SOURCE: ABNORMAL
TRANSFUSION STATUS PATIENT QL: NORMAL

## 2018-08-15 LAB
BACTERIA SPEC CULT: NO GROWTH
BACTERIA SPEC CULT: NO GROWTH
Lab: NORMAL
Lab: NORMAL
SPECIMEN SOURCE: NORMAL
SPECIMEN SOURCE: NORMAL

## 2018-08-16 LAB
7AMINOCLONAZEPAM BLD-MCNC: NEGATIVE NG/ML
ALPRAZ SERPL-MCNC: NEGATIVE NG/ML
AMPHETAMINES SPEC-MCNC: NEGATIVE NG/ML
APAP BLD-MCNC: NEGATIVE UG/ML
BACTERIA SPEC CULT: NO GROWTH
BARBITURATES SPEC-MCNC: NEGATIVE UG/ML
BENZODIAZ SERPL QL: POSITIVE
BENZODIAZ SPEC-MCNC: NORMAL NG/ML
BUPRENORPHINE SERPLBLD-MCNC: NEGATIVE NG/ML
CARBOXYTHC BLD-MCNC: NEGATIVE NG/ML
CARISOPRODOL IA: NEGATIVE UG/ML
CHLORDIAZEP SERPL-MCNC: NEGATIVE NG/ML
CLONAZEPAM SERPL CFM-MCNC: NEGATIVE NG/ML
COCAINE METABOLITE IA: NEGATIVE NG/ML
DECLARED MEDICATIONS: NORMAL
DESALKYLFLURAZ SERPL-MCNC: NEGATIVE NG/ML
DIAZEPAM SERPL-MCNC: NEGATIVE NG/ML
ETHANOL BLD-MCNC: NEGATIVE GM/DL
FENTANYL IA: NEGATIVE NG/ML
FLURAZEPAM SERPL-MCNC: NEGATIVE NG/ML
GABAPENTIN IA: NEGATIVE UG/ML
LORAZEPAM SERPL CFM-MCNC: NEGATIVE NG/ML
Lab: NORMAL
MEPERIDINE SERPLBLD-MCNC: NEGATIVE NG/ML
METHADONE BLD-MCNC: NEGATIVE NG/ML
MIDAZOLAM BLD CFM-MCNC: 32 NG/ML
NORCHLORDIAZEP SERPL-MCNC: NEGATIVE NG/ML
NORDIAZEPAM SERPL-MCNC: NEGATIVE NG/ML
OPIATES SPEC-MCNC: NEGATIVE NG/ML
OTHER DRUGS DETECTED: POSITIVE
OXAZEPAM SERPL-MCNC: NEGATIVE NG/ML
OXYCODONE SERPLBLD SCN-MCNC: NEGATIVE NG/ML
PCP SPEC-MCNC: NEGATIVE NG/ML
PROPOXYPH SPEC-MCNC: NEGATIVE NG/ML
SPECIMEN SOURCE: NORMAL
TEMAZEPAM SERPL-MCNC: NEGATIVE NG/ML
TRAMADOL BLD-MCNC: NEGATIVE NG/ML
TRIAZOLAM BLD-MCNC: NEGATIVE NG/ML

## 2021-05-25 ENCOUNTER — RECORDS - HEALTHEAST (OUTPATIENT)
Dept: ADMINISTRATIVE | Facility: CLINIC | Age: 81
End: 2021-05-25

## 2021-05-27 ENCOUNTER — RECORDS - HEALTHEAST (OUTPATIENT)
Dept: ADMINISTRATIVE | Facility: CLINIC | Age: 81
End: 2021-05-27

## 2021-05-29 ENCOUNTER — RECORDS - HEALTHEAST (OUTPATIENT)
Dept: ADMINISTRATIVE | Facility: CLINIC | Age: 81
End: 2021-05-29

## 2021-06-01 ENCOUNTER — RECORDS - HEALTHEAST (OUTPATIENT)
Dept: ADMINISTRATIVE | Facility: CLINIC | Age: 81
End: 2021-06-01

## 2021-06-01 VITALS — WEIGHT: 209.1 LBS | BODY MASS INDEX: 29.27 KG/M2 | HEIGHT: 71 IN

## 2021-06-01 VITALS
BODY MASS INDEX: 28.32 KG/M2 | BODY MASS INDEX: 28.32 KG/M2 | WEIGHT: 202.3 LBS | HEIGHT: 71 IN | HEIGHT: 71 IN | WEIGHT: 202.3 LBS

## 2021-06-01 VITALS — HEIGHT: 71 IN | WEIGHT: 215 LBS | BODY MASS INDEX: 30.1 KG/M2

## 2021-06-01 VITALS — BODY MASS INDEX: 30.1 KG/M2 | WEIGHT: 215 LBS | HEIGHT: 71 IN

## 2021-06-01 VITALS — BODY MASS INDEX: 30.35 KG/M2 | HEIGHT: 70 IN | WEIGHT: 212 LBS

## 2021-06-01 VITALS — WEIGHT: 215 LBS | BODY MASS INDEX: 30.1 KG/M2 | HEIGHT: 71 IN

## 2021-06-02 ENCOUNTER — RECORDS - HEALTHEAST (OUTPATIENT)
Dept: ADMINISTRATIVE | Facility: CLINIC | Age: 81
End: 2021-06-02

## 2021-06-09 ENCOUNTER — RECORDS - HEALTHEAST (OUTPATIENT)
Dept: ADMINISTRATIVE | Facility: CLINIC | Age: 81
End: 2021-06-09

## 2021-06-09 ENCOUNTER — RECORDS - HEALTHEAST (OUTPATIENT)
Dept: CARDIOLOGY | Facility: CLINIC | Age: 81
End: 2021-06-09

## 2021-06-18 ENCOUNTER — RECORDS - HEALTHEAST (OUTPATIENT)
Dept: ADMINISTRATIVE | Facility: CLINIC | Age: 81
End: 2021-06-18

## 2021-06-18 NOTE — PROGRESS NOTES
[facility] Follow-up Note    Assessment & Plan        1. Coronary atherosclerosis due to lipid rich plaque -most recent angiogram on this patient showed a normal left main with essentially normal left anterior descending with a 50% ostial second diagonal. He had a mid total occlusion of the circumflex and distal right coronary artery 90% posterior lateral as well as distal 60% lesion. Medical therapy was chosen on this angiogram from November 2012.  He had stress test January 2015 which showed medium sized area of inferior inferolateral scar with some jesus-infarct ischemia.  He has some vague increased fatigue as well as some shortness of breath and heavy activity.  Given this, we will arrange for repeat stress testing with pharmacological agent.  If significant ischemia proceed with invasive angiogram.  If not, we will switch Effient over to Plavix given his age and prior history of hemorrhagic stroke.   2. Hypercholesteremia -cholesterol 103 with an LDL of 42 which is excellent but this is from 2015 and recent blood work from primary clinic in May 2018 shows a cholesterol 109 with an LDL of 36 which is excellent.   3. Essential hypertension -under good control.   4. Hemorrhagic Stroke -as above will change Effient over to Plavix.     Plan  1.  Stress test and if significant ischemia angiography.  2.  After stress test change Effient over to Plavix.  3.  Follow-up with me in 1 year or sooner if needed.    Subjective  CC: 78-year-old white gentleman being seen in yearly follow-up today.  He comes in tell me he is a little bit more fatigued, taken half an hour nap every afternoon.  He also tells me he has some shortness of breath and heavy activity but he is still woodworking, fishing, and splitting wood.  There is no significant chest discomfort, palpitations, PND, orthopnea or peripheral edema.    Medications  Current Outpatient Prescriptions   Medication Sig     ACCU-CHEK EVARISTO PLUS TEST STRP  "Strp USE ONE STRIP TO CHECK BLOOD GLUCOSE TWICE DAILY     amLODIPine (NORVASC) 10 MG tablet Take 10 mg by mouth daily.     atorvastatin (LIPITOR) 40 MG tablet Take 1 tablet (40 mg total) by mouth daily.     blood sugar diagnostic (FREESTYLE LITE STRIPS) Strp Use As Directed.     cyanocobalamin 1000 MCG tablet Take 1,000 mcg by mouth daily.     isosorbide mononitrate (IMDUR) 30 MG 24 hr tablet Take 1 tablet (30 mg total) by mouth daily.     lisinopril (PRINIVIL,ZESTRIL) 40 MG tablet TAKE ONE TABLET BY MOUTH ONCE DAILY     metFORMIN (GLUCOPHAGE) 500 MG tablet TAKE ONE TABLET BY MOUTH TWICE DAILY WITH FOOD     metoprolol tartrate (LOPRESSOR) 50 MG tablet Take 50 mg by mouth 2 (two) times a day.     nitroglycerin (NITROSTAT) 0.4 MG SL tablet Use prn, max of every 5 min for 3. 911 if symptoms persist.     prasugrel (EFFIENT) 10 mg Tab tablet Take 1 tablet (10 mg total) by mouth daily.     psyllium (METAMUCIL) 3.4 gram packet Take 1 packet by mouth daily.     tamsulosin (FLOMAX) 0.4 mg Cp24 Take 0.4 mg by mouth daily.     triamcinolone (KENALOG) 0.1 % cream Apply topically as needed.       Objective  /66 (Patient Site: Left Arm, Patient Position: Sitting, Cuff Size: Adult Large)  Pulse 68  Resp 16  Ht 5' 9.5\" (1.765 m)  Wt 212 lb (96.2 kg)  BMI 30.86 kg/m2    General Appearance:    Alert, cooperative, no distress, appears stated age   Head:    Normocephalic, without obvious abnormality, atraumatic   Throat:   Lips, mucosa, and tongue normal; teeth and gums normal   Neck:   Supple, symmetrical, trachea midline, no adenopathy;        thyroid:  No enlargement/tenderness/nodules; no carotid    bruit or JVD   Back:     Symmetric, no curvature, ROM normal, no CVA tenderness   Lungs:     Clear to auscultation bilaterally, respirations unlabored   Chest wall:    No tenderness or deformity   Heart:    Regular rate and rhythm, S1 and S2 normal, no murmur, rub   or gallop   Abdomen:     Soft, non-tender, bowel sounds " active all four quadrants,     no masses, no organomegaly   Extremities:   Normal, atraumatic, no cyanosis or edema   Pulses:   2+ and symmetric all extremities   Skin:   Skin color, texture, turgor normal, no rashes or lesions     Results    Lab Results personally reviewed   Lab Results   Component Value Date    CHOL 103 09/10/2015    CHOL 101 01/30/2015     Lab Results   Component Value Date    HDL 42 09/10/2015    HDL 42 01/30/2015     Lab Results   Component Value Date    LDLCALC 42 09/10/2015    LDLCALC 41 01/30/2015     Lab Results   Component Value Date    TRIG 95 09/10/2015    TRIG 89 01/30/2015     Lab Results   Component Value Date    WBC 9.4 02/24/2016    HGB 15.1 02/24/2016    HCT 44.2 02/24/2016     02/24/2016     Lab Results   Component Value Date    CREATININE 0.80 02/24/2016    BUN 17 02/24/2016     02/24/2016    K 4.2 02/24/2016    CO2 21 (L) 02/24/2016     Review of Systems:   General: WNL  Eyes: WNL  Ears/Nose/Throat: WNL  Lungs: WNL  Heart: WNL  Stomach: WNL  Bladder: Frequent Urination at Night  Muscle/Joints: Muscle Weakness  Skin: WNL  Nervous System: WNL  Mental Health: WNL     Blood: WNL

## 2021-06-19 NOTE — PROGRESS NOTES
S/p prolonged cardiac arrest, initial rhythm ventricular fibrillation, emergent  thoracotomy, open chest CPR, bag valve m ask ventilation on ECMO, CABG x 4  on 8/8/2018. Transported to CATH lab with transport vent. Will continue to support.  Said I Duale

## 2021-06-19 NOTE — ANESTHESIA PROCEDURE NOTES
Arterial Line  Reason for Procedure: hemodynamic monitoring and multiple ABGs  Patient location during procedure: Pre-op  Start time: 8/8/2018 10:30 AM  End time: 8/8/2018 10:33 AM  Staffing:  Performing  Anesthesiologist: RANDY MARSHALL  Performing CRNA: SHAKIR CORRAL  Sterile Precautions:  sterile barriers used during insertion: cap, mask, sterile gloves, large sheet, and hand hygiene used.  Arterial Line:   Immediately prior to procedure a time out was called to verify the correct patient, procedure, equipment, support staff and site/side marked as required  Laterality: right  Location: radial  Prepped with: ChloroPrep    Needle gauge: 20 G  Number of Attempts: 1  Secured with: tape and transparent dressing (sutured)  Flushed with: saline  1% lidocaine local anesthesia used for skin prep.   See MAR for additional medications given.  Ultrasound evaluation of access site: yes  Vessel patent by US exam    Concurrent real time visualization of needle entry    Additional Notes:  No issues.  + waveform.  biopatch placed

## 2021-06-19 NOTE — PROGRESS NOTES
Cardiothoracic Surgery Consult    Date of Service: 7/28/2018    REFERRING CARDIOLOGIST: Dr. Mcrae    REASON FOR CONSULTATION: Coronary artery disease     HISTORY OF PRESENT ILLNESS: Mr. Jay Moses is a 78 y.o. male presenting with coronary artery disease.  He complains of vague fatigue, and shortness of breath.  He has positive stress test.  Elective coronary angiogram showed significant 2V CAD.    He is seen today in clinic with his wife.  They are eager to proceed with surgery.         ASSESSMENT and PLAN:   1) Plan for outpatient CABG in 2-4 weeks - LAD, D1, OM2, RCA/PDA  2) Check repeat TTE, non-contrast CT chest, bilateral carotid US, vein mapping at [facility] visit  3) He will call to schedule  4) Hold plavix 7d prior to surgery  5) Please call with questions      PAST MEDICAL HISTORY:   Past Medical History:   Diagnosis Date     Cancer (H) 2018    skin     Coronary artery disease      Diabetes mellitus (H)      Hyperlipidemia      Hypertension      Stroke (H) 2009    Hemorrhagic       PAST SURGICAL HISTORY:   Past Surgical History:   Procedure Laterality Date     BACK SURGERY  1994     CARDIAC CATHETERIZATION  2012    medical management     CORONARY ANGIOPLASTY WITH STENT PLACEMENT  2002     CV CORONARY ANGIOGRAM N/A 7/23/2018    Procedure: Coronary Angiogram;  Surgeon: Herbert Blanco MD;  Location: [facility] Lab;  [facility]      [facility]  [facility]    nose and cheek     REPLACEMENT TOTAL KNEE Left 2010       ALLERGIES:   No Known Allergies    CURRENT MEDICATIONS:  Current Outpatient Prescriptions on File Prior to Visit   Medication Sig Dispense Refill     ACCU-CHEK EVARISTO PLUS TEST STRP Strp USE ONE STRIP TO CHECK BLOOD GLUCOSE TWICE DAILY 200 strip 3     amLODIPine (NORVASC) 10 MG tablet Take 10 mg by mouth daily.       atorvastatin (LIPITOR) 40 MG tablet Take 1 tablet (40 mg total) by mouth daily. 90 tablet 2     blood sugar diagnostic (FREESTYLE LITE STRIPS) Strp Use As Directed.    "    clopidogrel (PLAVIX) 75 mg tablet Take 1 tablet (75 mg total) by mouth daily. 90 tablet 5     cyanocobalamin 1000 MCG tablet Take 1,000 mcg by mouth daily.       isosorbide mononitrate (IMDUR) 30 MG 24 hr tablet Take 1 tablet (30 mg total) by mouth daily. 90 tablet 2     lisinopril (PRINIVIL,ZESTRIL) 40 MG tablet TAKE ONE TABLET BY MOUTH ONCE DAILY 90 tablet 2     metFORMIN (GLUCOPHAGE) 500 MG tablet TAKE ONE TABLET BY MOUTH TWICE DAILY WITH FOOD 180 tablet 1     metoprolol tartrate (LOPRESSOR) 50 MG tablet Take 50 mg by mouth 2 (two) times a day.       nitroglycerin (NITROSTAT) 0.4 MG SL tablet Use prn, max of every 5 min for 3. 911 if symptoms persist. 30 tablet 0     psyllium (METAMUCIL) 3.4 gram packet Take 1 packet by mouth daily.       tamsulosin (FLOMAX) 0.4 mg Cp24 Take 0.4 mg by mouth daily.       triamcinolone (KENALOG) 0.1 % cream Apply topically as needed.       No current facility-administered medications on file prior to visit.        FAMILY HISTORY:   No family history on file.  The family history was reviewed and is not pertinent to the patient's disease/illness    SOCIAL HISTORY:   Social History     Social History     Marital status:      Spouse name: N/A     Number of children: N/A     Years of education: N/A     Occupational History     Not on file.     Social History Main Topics     Smoking status: Former Smoker     Packs/day: 1.50     Years: 25.00     Quit date: 1/13/1987     Smokeless tobacco: Never Used     Alcohol use No     Drug use: No     Sexual activity: Not on file     Other Topics Concern     Not on file     Social History Narrative       REVIEW OF SYSTEMS:  Review of Systems - A complete 10 point review of systems was obtained and is negative other than the above stated complaints    PHYSICAL EXAMINATION:   Vitals: /70 (Patient Site: Left Arm, Patient Position: Sitting, Cuff Size: Adult Large)  Pulse 76  Resp 18  Ht 5' 11\" (1.803 m)  Wt 215 lb (97.5 kg)  BMI " 29.99 kg/m2  GENERAL:  Well developed and well nourished  HEENT: Atraumatic, normocephalic, pupils equal and reactive, CN 2-12 intact  NECK: Supple, trachea midline, no thyromegaly, no lymphadenopathy  CARDIOVASCULAR: RRR  RESPIRATIONS: CTAB  ABDOMEN: Soft, not tender, not distended  EXTREMITIES: Motor, sensation, pulses intact  NEUROLOGIC: intact and symmetric with no focal deficits  PSYCHIATRIC: alert and oriented x3, pleasant    LABORATORY STUDIES:   Lab Results   Component Value Date    WBC 8.9 07/23/2018    HGB 14.9 07/23/2018    HCT 43.9 07/23/2018    MCV 87 07/23/2018     07/23/2018      Lab Results   Component Value Date    CREATININE 0.80 07/23/2018    BUN 15 07/23/2018     07/23/2018    K 4.2 07/23/2018     07/23/2018    CO2 24 07/23/2018     Lab Results   Component Value Date    HGBA1C 6.8 (H) 02/24/2016       EKG (7/24/18):  Normal sinus rhythm   Inferior infarct (cited on or before 25-NOV-2003)   Abnormal ECG   When compared with ECG of 01-DEC-2012 07:51,   No significant change was found   Confirmed by JOSUE LUNA MD LOC: ([id number]) on 7/24/2018 2:49:59 PM     CARDIAC CATHETERIZATION (7/23/18):  This study was personally reviewed by me  Conclusion        Estimated blood loss was <20 ml.    Ost RCA to Mid RCA lesion 60% stenosed.    Mid RCA to Dist RCA lesion 85% stenosed.    Mid LAD lesion 65% stenosed.    Pressure wire/FFR was performed on the lesion. pre diagnositic: 0.93. post diagnostic: 0.8.    Pressure wire/FFR was performed on the lesion.    Ost 2nd Diag lesion 75% stenosed.    Ost 2nd Mrg lesion 100% stenosed.    The LM vessel was moderate.      Pt with hx of PCI to RCA 2002, known  of small OM and severe dz in PL branch with progressoin of dyspnea on exertion and abn stress imaging in two territories     Angiography  LM min dz  LAD prox to mid calcified, from mid to distal >35mm segement 50-60% with FFR 0.8; ostial 2nd diagonal 70%  Circ prox 50% and OM2 100% with  collatearls  RCA prox 60% and instent mid 60%, distal 85% (new) and resolution of lesion in PL branch     Imp/plan  1. Severe multivessel dz - discussed with referring cardiologist re options of PCI vs CABG, will stop to discuss and obtain CT surgery consult.  (ntoed hx of hemorrhagic CVA in the past - change effient to clopidogrel).         TRANSTHORACIC ECHOCARDIOGRAM (7/22/2015):   This study was personally reviewed by me  Conclusions       Summary   1.Left ventricular ejection fraction is lower limits of normal.   2.Mild aortic regurgitation is noted.   3.Technically limited study due to poor image quality.   4.No previous echo for comparison.       Findings       Left Ventricle   Normal left ventricular size.   Borderline concentric left ventricular hypertrophy.   Left ventricular ejection fraction is normal.   Left ventricular ejection fraction is calculated to be 57%.   Mild (grade 1) diastolic dysfunction with normal left ventricular filling   pressure.       Right Ventricle   Normal right ventricular size and systolic function.       Left Atrium   Normal left atrial size.       Right Atrium   Normal right atrial size.       Great Vessels   Mild dilation of aortic root       Aortic Valve   Aortic valve appears to be tricuspid.   No evidence of aortic stenosis.   Mild aortic regurgitation is noted.   The aortic valve leaflets were not well visualized.   Aortic valve leaflets are mildly thickened.       Mitral Valve   Structurally normal mitral valve.   No evidence of mitral valve stenosis.   No evidence of mitral regurgitation.       Tricuspid Valve   Tricuspid valve is structurally normal.   No evidence of tricuspid stenosis.   Trace tricuspid regurgitation.   Doppler findings do not suggest pulmonary hypertension.       Pulmonic Valve   The pulmonic valve is not well visualized.   No evidence of pulmonic regurgitation.   No evidence of pulmonic valve stenosis.       Pericardial Effusion   No pericardial  effusion.      M-Mode/2D Measurements & Calculations                        LV Volume Diastolic: 67 ml     LA Dimension: 3.2 cmAO                    LV Volume Systolic: 29 ml      Root Dimension: 4.1 cm                    LV EDV/LV EDV Index: 67 ml/31                    m^2LV ESV/LV ESV Index: 29                    ml/14 m^2   LV Area          EF Calculated: 56.7 %   Diastolic: 20.2   cm^2             LV Length: 7.5 cm              LA/Aorta: 0.78   LV Area   Systolic: 14     LVOT: 2.1 cm                   LA volume/Index: 57.1 ml   cm^2                                            /27m^2      Doppler Measurements & Calculations       MV Peak E-Wave: 39.5                  LVOT Peak Velocity: 86.9 cm/s   cm/s                                  LVOT Mean Velocity: 56.2 cm/s   MV Peak A-Wave: 105 cm/s              LVOT Peak Gradient: 3 mmHgLVOT Mean   MV E/A Ratio: 0.38                    Gradient: 2 mmHg   MV Peak Gradient: 0.62   LVOT VTI:   mmHg                     18.7 cm       MV Deceleration Time:   342 msec                            E/E': 5.3       MV E' Septal Velocity:   3.8 cm/s   MV E' Lateral Velocity:   7.51 cm/s    STS RISK CALCULATION:  Risk Model and Variables - STS Adult Cardiac Surgery Database Version 2.81    RISK SCORES  About the STS Risk Calculator    Procedure: CAB Only    Risk of Mortality: 1.176%    Morbidity or Mortality: 10.448%    Long Length of Stay: 4.711%    Short Length of Stay: 41.414%    Permanent Stroke: 0.993%    Prolonged Ventilation: 5.498%    DSW Infection: 0.519%    Renal Failure: 2.429%    Reoperation: 4.166%  Thank you very much for this referral.     Chris Espinal 7/28/2018 9:30 AM  Cardiothoracic Surgery  Pager: [phone]

## 2021-06-19 NOTE — ANESTHESIA PREPROCEDURE EVALUATION
Anesthesia Evaluation      Patient summary reviewed   No history of anesthetic complications     Airway   Mallampati: III  Neck ROM: full   Pulmonary    (+) shortness of breath, sleep apnea, rhonchi, a smoker                         Cardiovascular   Exercise tolerance: < 4 METS  (+) hypertension, past MI, CAD, CABG/stent, dysrhythmias, cardiomyopathy, hypercholesterolemia,     ECG reviewed  Rhythm: regular  Rate: normal,         Neuro/Psych    (+) CVA ,     Endo/Other    (+) diabetes mellitus, arthritis,      GI/Hepatic/Renal - negative ROS   (-) esophageal disease     Other findings: Conclusion        Estimated blood loss was <20 ml.    Ost RCA to Mid RCA lesion 60% stenosed.    Mid RCA to Dist RCA lesion 85% stenosed.    Mid LAD lesion 65% stenosed.    Pressure wire/FFR was performed on the lesion. pre diagnositic: 0.93. post diagnostic: 0.8.    Pressure wire/FFR was performed on the lesion.    Ost 2nd Diag lesion 75% stenosed.    Ost 2nd Mrg lesion 100% stenosed.    The LM vessel was moderate.      Pt with hx of PCI to RCA 2002, known  of small OM and severe dz in PL branch with progressoin of dyspnea on exertion and abn stress imaging in two territories     Angiography  LM min dz  LAD prox to mid calcified, from mid to distal >35mm segement 50-60% with FFR 0.8; ostial 2nd diagonal 70%  Circ prox 50% and OM2 100% with collatearls  RCA prox 60% and instent mid 60%, distal 85% (new) and resolution of lesion in PL branch     Imp/plan  1. Severe multivessel dz - discussed with referring cardiologist re options of PCI vs CABG, will stop to discuss and obtain CT surgery consult. (ntoed hx of hemorrhagic CVA in the past - change effient to clopidogrel).         Conclusion     The pharmacologic nuclear stress test is abnormal.    There is a medium sized area of ischemia in the anterior, inferior and apical segment(s) of the left ventricle. There is a medium sized area of a moderate degree of nontransmural infarction  in the inferior and inferolateral segment(s) of the left ventricle.    The patient is at an intermediate risk of future cardiac ischemic events.    The left ventricular ejection fraction is 43%.    When compared to the images of 1/15/2015, there is now evidence of ischemia involving the apex and apical anterior wall.        Summary   1.Left ventricular ejection fraction is lower limits of normal.   2.Mild aortic regurgitation is noted.   3.Technically limited study due to poor image quality.   4.No previous echo for comparison.      Dental    (+) poor dentition and chipped                       Anesthesia Plan  Planned anesthetic: general endotracheal    Norepinephrine inline for induction  Ketamine 50 mg IV  Methadone 20 mg IV after induction  Glidescope    NADIA for monitoring    Slightly higher risk of anesthesia awareness compared to general population discussed with patient.  Patient also at risk of brain ischemia.      Induction:  Slow controlled induction.  Will optimize myocardial oxygen demands.    Also will try to optimize cerebral perfusion pressure          ASA 4   Induction: intravenous   Anesthetic plan and risks discussed with: patient and spouse  Anesthesia plan special considerations: increased risk of difficult airway, antiemetics, CVP line, arterial catheterization, pulmonary artery catheterization, IV therapy two IVs, dexmedetomidine  Post-op plan: extended intubation/vent support and routine recovery

## 2021-06-19 NOTE — ANESTHESIA CARE TRANSFER NOTE
Patient transferred to bed.  Transport monitor on.  O2 at 15L via ambu bag.  Accompanied to ICU with anesthesiologist.  In ICU vital signs stable.  Vent settings: tidal volume 700, rate 16, FiO2 100%, PEEP 5.  SBAR report to RN per institutional policy and procedure.  Transfer of care.    Last vitals:   Vitals:    08/08/18 1610   BP: 134/46   Pulse: 88   Resp: 16   Temp: 36.4  C (97.6  F)   SpO2: 100%     Patient's level of consciousness is unresponsive  Spontaneous respirations: no: vent  Maintains airway independently: no: intubated  Dentition unchanged: yes  Oropharynx: endotracheal tube in place    QCDR Measures:  ASA# 20 - Surgical Safety Checklist: WHO surgical safety checklist completed prior to induction  PQRS# 430 - Adult PONV Prevention: 4558F-8P - Pt did NOT receive => 2 anti-emetic agents  ASA# 8 - Peds PONV Prevention: NA - Not pediatric patient, not GA or 2 or more risk factors NOT present  PQRS# 424 - Lizabeth-op Temp Management: 4559F - At least one body temp DOCUMENTED => 35.5C or 95.9F within required timeframe  PQRS# 426 - PACU Transfer Protocol: - Transfer of care checklist used  ASA# 14 - Acute Post-op Pain: ASA14B - Patient did NOT experience pain >= 7 out of 10

## 2021-06-19 NOTE — PROGRESS NOTES
Notes Recorded by Crystal Mcrae MD on 7/9/2018 at 5:52 PM  Agree with tz plan.  LF  ------    Notes Recorded by Kit Barney) MD Oscar on 7/9/2018 at 4:39 PM  He should undergo coronary angiogram and possible intervention because of new anterior ischemia      Order placed for coronary angiogram with possible pci. [place] [place]

## 2021-06-19 NOTE — ANESTHESIA PROCEDURE NOTES
Central line    Start time: 8/8/2018 10:37 AM  End time: 8/8/2018 10:45 AM  Patient location: OR Post-induction  Indications: central pressure monitoring and vascular access  Performing Anesthesiologist: RANDY MARSHALL  Pre-procedure Checklist  Completed: patient identified, site marked, risks, benefits, and alternatives discussed, timeout performed, consent obtained, hand hygiene performed, all elements of maximal sterile barriers used including cap, mask, gown, sterile gloves, and large sheet and skin prep agent completely dried prior to procedure    Procedure Details:  Preparation: 2% chlorhexidine  Location details: right internal jugular  Catheter type: Introducer with [place]ie  Introducer type: MAC  Lumens:double lumenWedged at: not wedged   Withdrawn and locked at: 65Number of attempts: 1  Ultrasound evaluation of access site: yes  Vessel patent by US exam  Concurrent real time visualization of needle entryTransduced for venous waveform  manometry confirmation of venous access    Post-procedure:   line sutured and Antimicrobial disks with CHG applied  Assessment: blood return through all ports and free fluid flow  Complications: none  Comments: No issues.

## 2021-06-19 NOTE — PROGRESS NOTES
"Spiritual Care Note    Spiritual Assessment: I introduced myself and Spiritual Care to the patient, his spouse and son. When I asked about how he feels about the upcoming surgery, he paused and then teared up. \"I'm not worried, it'll be fine,\" he said. He paused for another few seconds and said this again. This writer educated patient and family about emotional responses to major life events, and when asked whether he is emotionally expressive, he affirmed he was. It was hard for this writer to determine his wife's non-verbal response to his response. He said that he's [employer] and indicated that he engages it \"in his own way\". He accepted the offer for prayer and we prayed together.     Care Provided: Provided empathetic and pastoral presence, explored emotional response and affirmed emotions, provided prayer    Plan of Care:  available for spiritual care or emotional support as needed.      Ritu Zarate,     "

## 2021-06-27 ENCOUNTER — HEALTH MAINTENANCE LETTER (OUTPATIENT)
Age: 81
End: 2021-06-27

## 2021-10-16 ENCOUNTER — HEALTH MAINTENANCE LETTER (OUTPATIENT)
Age: 81
End: 2021-10-16

## 2022-04-05 NOTE — ANESTHESIA POSTPROCEDURE EVALUATION
Patient: Jay VIDAL Norlund  CORONARY ARTERY BYPASS x4, LEFT SAPHENOUS ENDOSCOPIC VEIN HARVEST,  INTERNAL MAMMARY ARTERY EPI-AORTIC ULTRASOUND, ANESTHESIA TRANSESOPHAGEAL ECHOCARDIOGRAM  Anesthesia type: general    Patient location: ICU  Last vitals:   Vitals:    08/08/18 1845   BP:    Pulse: (!) 131   Resp:    Temp:    SpO2: 100%     Post vital signs: unstable  Level of consciousness: Responds to painful stimuli  Post-anesthesia pain: pain controlled with precedex, fentanyl and versed IV  Post-anesthesia nausea and vomiting: no  Pulmonary: ETT controlled ventilation  Cardiovascular: S/P cardiac arrest with prolonged CPR, open cardiac message, and ECMO  Hydration: adequate  Anesthetic events: no    QCDR Measures:  ASA# 11 - Lizabeth-op Cardiac Arrest: ASA11A - Patient experienced unanticipated cardiac arrest  ASA# 12 - Lizabeth-op Mortality Rate: ASA12B - Patient did NOT die  ASA# 13 - PACU Re-Intubation Rate: ASA13G - Patient had a planned trial of extubation  ASA# 10 - Composite Anes Safety: ASA10B - Serious adverse event.  See anesthesia events within the post-eval note. Cardiac arrest, return to cath lab for angio and stent of distal LAD, transported to the [facility] CVICU.    Additional Notes:   [employer] dose 1 and 2

## 2022-07-23 ENCOUNTER — HEALTH MAINTENANCE LETTER (OUTPATIENT)
Age: 82
End: 2022-07-23

## 2022-10-01 ENCOUNTER — HEALTH MAINTENANCE LETTER (OUTPATIENT)
Age: 82
End: 2022-10-01

## 2023-08-06 ENCOUNTER — HEALTH MAINTENANCE LETTER (OUTPATIENT)
Age: 83
End: 2023-08-06

## (undated) DEVICE — ESU ELEC BLADE 2.75" COATED/INSULATED E1455

## (undated) DEVICE — CONNECTOR DRAIN CHEST Y EXTENSION SET 19909

## (undated) DEVICE — SU ETHIBOND 0 CT-1 CR 8X18" CX21D

## (undated) DEVICE — SU PROLENE 4-0 SHDA 36" 8521H

## (undated) DEVICE — DRAIN CHEST TUBE 36FR STR 8036

## (undated) DEVICE — TIES BANDING T50R

## (undated) DEVICE — SU PROLENE 4-0 RB-1DA 36" 8557H

## (undated) DEVICE — SUCTION DRY CHEST DRAIN OASIS 3600-100

## (undated) DEVICE — PREP DURAPREP 26ML APL 8630

## (undated) DEVICE — SOL NACL 0.9% IRRIG 1000ML BOTTLE 2F7124

## (undated) DEVICE — Device

## (undated) DEVICE — ESU GROUND PAD ADULT W/CORD E7507

## (undated) DEVICE — PREP SKIN SCRUB TRAY 4461A

## (undated) DEVICE — DRAPE IOBAN INCISE 23X17" 6650EZ

## (undated) DEVICE — DRSG KERLIX 4 1/2"X4YDS ROLL 6715

## (undated) DEVICE — BNDG ESMARK 6" STERILE LF 820-3612